# Patient Record
Sex: FEMALE | Race: WHITE | NOT HISPANIC OR LATINO | Employment: FULL TIME | URBAN - NONMETROPOLITAN AREA
[De-identification: names, ages, dates, MRNs, and addresses within clinical notes are randomized per-mention and may not be internally consistent; named-entity substitution may affect disease eponyms.]

---

## 2017-03-07 ENCOUNTER — OFFICE VISIT (OUTPATIENT)
Dept: CARDIOLOGY | Facility: CLINIC | Age: 62
End: 2017-03-07

## 2017-03-07 VITALS
SYSTOLIC BLOOD PRESSURE: 106 MMHG | HEIGHT: 65 IN | BODY MASS INDEX: 33.32 KG/M2 | WEIGHT: 200 LBS | HEART RATE: 76 BPM | DIASTOLIC BLOOD PRESSURE: 70 MMHG

## 2017-03-07 DIAGNOSIS — I25.10 CORONARY ARTERY DISEASE INVOLVING NATIVE CORONARY ARTERY OF NATIVE HEART WITHOUT ANGINA PECTORIS: Primary | ICD-10-CM

## 2017-03-07 DIAGNOSIS — E11.9 TYPE 2 DIABETES MELLITUS WITHOUT COMPLICATION, WITHOUT LONG-TERM CURRENT USE OF INSULIN (HCC): ICD-10-CM

## 2017-03-07 DIAGNOSIS — I10 ESSENTIAL HYPERTENSION: ICD-10-CM

## 2017-03-07 DIAGNOSIS — E78.49 OTHER HYPERLIPIDEMIA: ICD-10-CM

## 2017-03-07 PROBLEM — E78.5 HYPERLIPEMIA: Status: ACTIVE | Noted: 2017-03-07

## 2017-03-07 PROCEDURE — 99213 OFFICE O/P EST LOW 20 MIN: CPT | Performed by: NURSE PRACTITIONER

## 2017-03-07 RX ORDER — PRASUGREL 10 MG/1
10 TABLET, FILM COATED ORAL DAILY
Qty: 90 TABLET | Refills: 3 | Status: SHIPPED | OUTPATIENT
Start: 2017-03-07 | End: 2017-09-11 | Stop reason: SDUPTHER

## 2017-03-07 RX ORDER — MULTIVITAMIN WITH IRON
1 TABLET ORAL DAILY
COMMUNITY

## 2017-03-07 RX ORDER — GLIPIZIDE 10 MG/1
10 TABLET ORAL DAILY
COMMUNITY
End: 2017-09-11 | Stop reason: ALTCHOICE

## 2017-03-07 RX ORDER — CARVEDILOL 3.12 MG/1
3.12 TABLET ORAL DAILY
Qty: 90 TABLET | Refills: 3 | Status: SHIPPED | OUTPATIENT
Start: 2017-03-07 | End: 2017-09-11 | Stop reason: SDUPTHER

## 2017-03-07 NOTE — PROGRESS NOTES
Chief Complaint   Patient presents with   • Follow-up     Had labs in Jan., she reports A1C 9.3. She states had went to imaging center for PARTH, didn't have anyone to do PARTH that day and was told they would call her for another appointment and nobody every called her.   • Palpitations     She reports same as before, nothing new.   • Med Refill     Needs refills on Coreg and Effient-90 day.       Cardiac Complaints  dyspnea      Subjective   Paige Gibbs is a 61 y.o. female history of ischemic heart disease diagnosed in 2011 when she underwent stenting of the LAD and then in 2012 underwent stenting of the ramus. Her last cardiac workup done in 2014 showed no evidence of ischemia.  At last visit, some claudication pain was reported and PARTH was recommended.  Patient reports she could not have it done as no one was there to conduct the test and they never called her back to reschedule.  She denies any claudication pain today. Most recent labs in January show AIC still elevated at 9.3 and medication changes were made.  She states she will follow with endocrinology this month.  She does report some issues with palpitations about same as prior.  Shortness of breath and chest pain denied.  Refills of coreg and effient requested.      Cardiac History  Past Surgical History   Procedure Laterality Date   • Cardiovascular stress test  07/18/2011     3 min, 30 sec. 93% THR. bp-164/72. R/O anteroseptal ischemia   • Echo - converted  08/02/2011     >60%   • Cath lab procedure  08/12/2011     75% LAD- 2.75x18 Promus.   • Echo - converted  05/14/2012     EF 65-70%   • Cardiovascular stress test  05/14/2012     4 min, 91% THR, 100/54, septal ischemi   • Cath lab procedure  05/25/2012     90% Ramus- 2.25x18 Promus   • Converted (historical) holter  12/05/2012     AVG HR 76 BPM. Rare PVC's   • Cardiovascular stress test  12/14/2012     4 min, 90% THR>BP-150/70. Negative   • Echo - converted  08/26/2014     EF 65%, RVSP 26 mmHg   •  Cardiovascular stress test  08/26/2014     6 min, 94% THR, 132/64, negative for ischemia       Current Outpatient Prescriptions   Medication Sig Dispense Refill   • aspirin 81 MG EC tablet Take 81 mg by mouth daily.     • Canagliflozin (INVOKANA) 100 MG tablet Take  by mouth Daily.     • carvedilol (COREG) 3.125 MG tablet Take 1 tablet by mouth daily. 90 tablet 3   • cycloSPORINE (RESTASIS) 0.05 % ophthalmic emulsion 1 drop 2 (two) times a day.     • glipiZIDE (GLUCOTROL) 10 MG tablet Take 10 mg by mouth Daily.     • levothyroxine (SYNTHROID, LEVOTHROID) 125 MCG tablet Take 125 mcg by mouth daily.     • Magnesium 250 MG tablet Take  by mouth Daily.     • metFORMIN (GLUCOPHAGE) 1000 MG tablet Take 1,000 mg by mouth 2 (two) times a day with meals.     • omeprazole (PriLOSEC) 20 MG capsule Take 20 mg by mouth daily.     • prasugrel (EFFIENT) 10 MG tablet Take 1 tablet by mouth daily. 90 tablet 3   • pravastatin (PRAVACHOL) 40 MG tablet Take 40 mg by mouth daily.     • traZODone (DESYREL) 100 MG tablet Take 100 mg by mouth every night.     • triamterene-hydrochlorothiazide (DYAZIDE) 37.5-25 MG per capsule Take 1 capsule by mouth every morning.     • Vitamin D, Cholecalciferol, 1000 UNITS tablet Take  by mouth daily.       No current facility-administered medications for this visit.        Codeine and Farxiga [dapagliflozin]    Past Medical History   Diagnosis Date   • Diabetes mellitus    • GERD (gastroesophageal reflux disease)    • H/O foot surgery      left and right foot   • History of cholecystectomy    • History of tonsillectomy    • Hyperlipidemia    • Hypothyroidism    • Pancreatitis    • Vitamin D deficiency        Social History     Social History   • Marital status:      Spouse name: N/A   • Number of children: N/A   • Years of education: N/A     Occupational History   • Not on file.     Social History Main Topics   • Smoking status: Former Smoker     Quit date: 1986   • Smokeless tobacco: Never Used  "  • Alcohol use No   • Drug use: No   • Sexual activity: Not on file     Other Topics Concern   • Not on file     Social History Narrative       Family History   Problem Relation Age of Onset   • Heart attack Mother      first MI at age 28   • Heart disease Father      CABG at age 56. 1-2 stents after that       Review of Systems   Constitutional: Negative.    Respiratory: Positive for shortness of breath.    Cardiovascular: Negative.    Gastrointestinal: Negative.    Musculoskeletal: Negative.    Skin: Negative.    Neurological: Negative.    Psychiatric/Behavioral: Negative.        DiabetesYes  Thyroidnormal    Objective     Visit Vitals   • /70 (BP Location: Left arm)   • Pulse 76   • Ht 65\" (165.1 cm)   • Wt 200 lb (90.7 kg)   • BMI 33.28 kg/m2       Physical Exam   Constitutional: She is oriented to person, place, and time. She appears well-developed and well-nourished.   HENT:   Head: Normocephalic and atraumatic.   Eyes: EOM are normal. Pupils are equal, round, and reactive to light.   Neck: Normal range of motion. Neck supple.   Cardiovascular: Normal rate and regular rhythm.    Murmur heard.  Pulmonary/Chest: Effort normal and breath sounds normal.   Abdominal: Soft.   Musculoskeletal: Normal range of motion.   Neurological: She is alert and oriented to person, place, and time.   Skin: Skin is warm and dry.   Psychiatric: She has a normal mood and affect.       Procedures    Assessment/Plan     HR and BP are both stable.  No cardiac testing will be advised at present as no new concerns are voiced.  She will follow with endocrinology this month for diabetes management.  Discussed with patient about a nutritionist for diabetic diet.  She does report watching carbs and was encouraged to look at the ADA website as a resource for counting carbs.  Labs with you, could we get next copy?  6 month follow up advised or sooner if needed.      Problems Addressed this Visit        Cardiovascular and Mediastinum    " CAD (coronary artery disease) - Primary    HTN (hypertension)    Hyperlipemia       Endocrine    Type 2 diabetes mellitus without complication, without long-term current use of insulin    Relevant Medications    Canagliflozin (INVOKANA) 100 MG tablet    glipiZIDE (GLUCOTROL) 10 MG tablet              Electronically signed by JULIAN Andrade March 7, 2017 1:30 PM

## 2017-09-11 ENCOUNTER — OFFICE VISIT (OUTPATIENT)
Dept: CARDIOLOGY | Facility: CLINIC | Age: 62
End: 2017-09-11

## 2017-09-11 VITALS
DIASTOLIC BLOOD PRESSURE: 78 MMHG | SYSTOLIC BLOOD PRESSURE: 120 MMHG | WEIGHT: 212 LBS | HEIGHT: 65 IN | HEART RATE: 76 BPM | BODY MASS INDEX: 35.32 KG/M2

## 2017-09-11 DIAGNOSIS — E78.49 OTHER HYPERLIPIDEMIA: ICD-10-CM

## 2017-09-11 DIAGNOSIS — I10 ESSENTIAL HYPERTENSION: ICD-10-CM

## 2017-09-11 DIAGNOSIS — I25.10 CORONARY ARTERY DISEASE INVOLVING NATIVE CORONARY ARTERY OF NATIVE HEART WITHOUT ANGINA PECTORIS: Primary | ICD-10-CM

## 2017-09-11 DIAGNOSIS — Z95.5 HISTORY OF PLACEMENT OF STENT IN LAD CORONARY ARTERY: ICD-10-CM

## 2017-09-11 PROCEDURE — 99213 OFFICE O/P EST LOW 20 MIN: CPT | Performed by: NURSE PRACTITIONER

## 2017-09-11 RX ORDER — PRASUGREL 10 MG/1
10 TABLET, FILM COATED ORAL DAILY
Qty: 90 TABLET | Refills: 3 | Status: SHIPPED | OUTPATIENT
Start: 2017-09-11 | End: 2018-09-10 | Stop reason: SDUPTHER

## 2017-09-11 RX ORDER — CARVEDILOL 3.12 MG/1
3.12 TABLET ORAL DAILY
Qty: 90 TABLET | Refills: 3 | Status: SHIPPED | OUTPATIENT
Start: 2017-09-11 | End: 2018-09-10 | Stop reason: SDUPTHER

## 2017-09-11 NOTE — PROGRESS NOTES
Chief Complaint   Patient presents with   • Follow-up     For cardiac management.   • Palpitations     She states has palpitations all the time, this is nothing new.   • Med Refill     Needs refills on Effient and Coreg-90 day.       Subjective       Paige Gibbs is a 62 y.o. female with a history of ischemic heart disease diagnosed in 2011 when she underwent stenting of the LAD and then in 2012 underwent stenting of the ramus. Her last cardiac workup done in 2014 showed no evidence of ischemia.  In the past she had complaints of claudication pain but unable to have PARTH done. Symptoms improved and no further testing done.   Today she comes to the office for a follow up appointment and no new or worsening complaints are voiced. She reports prior to stenting in the past her main symptoms were increased fatigue and mild sharp left sided chest pains. No reoccurrence of these symptoms noted. She admits to problems with going to sleep and has tried OTC medications without benefit.       HPI         Cardiac History:    Past Surgical History:   Procedure Laterality Date   • CARDIOVASCULAR STRESS TEST  07/18/2011    3 min, 30 sec. 93% THR. bp-164/72. R/O anteroseptal ischemia   • CARDIOVASCULAR STRESS TEST  05/14/2012    4 min, 91% THR, 100/54, septal ischemi   • CARDIOVASCULAR STRESS TEST  12/14/2012    4 min, 90% THR>BP-150/70. Negative   • CARDIOVASCULAR STRESS TEST  08/26/2014    6 min, 94% THR, 132/64, negative for ischemia   • CATH LAB PROCEDURE  08/12/2011    75% LAD- 2.75x18 Promus.   • CATH LAB PROCEDURE  05/25/2012    90% Ramus- 2.25x18 Promus   • CONVERTED (HISTORICAL) HOLTER  12/05/2012    AVG HR 76 BPM. Rare PVC's   • ECHO - CONVERTED  08/02/2011    >60%   • ECHO - CONVERTED  05/14/2012    EF 65-70%   • ECHO - CONVERTED  08/26/2014    EF 65%, RVSP 26 mmHg       Current Outpatient Prescriptions   Medication Sig Dispense Refill   • aspirin 81 MG EC tablet Take 81 mg by mouth daily.     • carvedilol (COREG)  3.125 MG tablet Take 1 tablet by mouth Daily. 90 tablet 3   • cycloSPORINE (RESTASIS) 0.05 % ophthalmic emulsion 1 drop 2 (two) times a day.     • Insulin Detemir (LEVEMIR FLEXPEN SC) Inject  under the skin Daily.     • levothyroxine (SYNTHROID, LEVOTHROID) 125 MCG tablet Take 125 mcg by mouth daily.     • Magnesium 250 MG tablet Take  by mouth Daily.     • metFORMIN (GLUCOPHAGE) 1000 MG tablet Take 1,000 mg by mouth 2 (two) times a day with meals.     • omeprazole (PriLOSEC) 20 MG capsule Take 20 mg by mouth daily.     • prasugrel (EFFIENT) 10 MG tablet Take 1 tablet by mouth Daily. 90 tablet 3   • pravastatin (PRAVACHOL) 40 MG tablet Take 40 mg by mouth daily.     • traZODone (DESYREL) 100 MG tablet Take 100 mg by mouth every night.     • triamterene-hydrochlorothiazide (DYAZIDE) 37.5-25 MG per capsule Take 1 capsule by mouth every morning.     • Vitamin D, Cholecalciferol, 1000 UNITS tablet Take  by mouth daily.       No current facility-administered medications for this visit.        Codeine; Farxiga [dapagliflozin]; Invokana [canagliflozin]; and Tresiba flextouch [insulin degludec]    Past Medical History:   Diagnosis Date   • Diabetes mellitus    • GERD (gastroesophageal reflux disease)    • H/O foot surgery     left and right foot   • History of cholecystectomy    • History of tonsillectomy    • Hyperlipidemia    • Hypothyroidism    • Pancreatitis    • Vitamin D deficiency        Social History     Social History   • Marital status:      Spouse name: N/A   • Number of children: N/A   • Years of education: N/A     Occupational History   • Not on file.     Social History Main Topics   • Smoking status: Former Smoker     Quit date: 1986   • Smokeless tobacco: Never Used   • Alcohol use No   • Drug use: No   • Sexual activity: Not on file     Other Topics Concern   • Not on file     Social History Narrative       Family History   Problem Relation Age of Onset   • Heart attack Mother      first MI at age 28  "  • Heart disease Father      CABG at age 56. 1-2 stents after that       Review of Systems   Constitution: Negative for decreased appetite and malaise/fatigue.   HENT: Positive for headaches (had MRI of head last month, pt reports WNL). Negative for congestion and sore throat.    Eyes: Negative for blurred vision.   Cardiovascular: Positive for palpitations (same). Negative for chest pain, dyspnea on exertion and paroxysmal nocturnal dyspnea.   Respiratory: Negative for shortness of breath and snoring.    Endocrine: Negative for cold intolerance and heat intolerance.   Hematologic/Lymphatic: Negative for adenopathy. Does not bruise/bleed easily.   Skin: Negative for itching, nail changes and skin cancer.   Musculoskeletal: Positive for arthritis. Negative for falls and myalgias.   Gastrointestinal: Negative for abdominal pain, dysphagia, heartburn and melena.   Genitourinary: Negative for dysuria, frequency and hematuria.   Neurological: Negative for dizziness, light-headedness, loss of balance, numbness, seizures and vertigo.   Psychiatric/Behavioral: Negative for altered mental status. The patient has insomnia (not been able to go to sleep lately).    Allergic/Immunologic: Negative for environmental allergies and hives.    Diabetes- Yes  Thyroid-abnormal    Objective     /78 (BP Location: Left arm)  Pulse 76  Ht 65\" (165.1 cm)  Wt 212 lb (96.2 kg)  BMI 35.28 kg/m2    Physical Exam   Constitutional: She is oriented to person, place, and time. She appears well-nourished.   HENT:   Head: Normocephalic.   Eyes: Conjunctivae are normal. Pupils are equal, round, and reactive to light.   Neck: Normal range of motion. No JVD present. Carotid bruit is not present. No edema present. No thyromegaly present.   Cardiovascular: Normal rate, regular rhythm, S1 normal and S2 normal.    No murmur heard.  Pulses:       Radial pulses are 2+ on the right side, and 2+ on the left side.   Pulmonary/Chest: Breath sounds " normal. She has no wheezes. She has no rales.   Abdominal: Soft. Bowel sounds are normal. She exhibits no distension. There is no tenderness.   Musculoskeletal: Normal range of motion. She exhibits no edema.   Neurological: She is alert and oriented to person, place, and time.   Skin: Skin is warm and dry. No rash noted. No pallor.   Psychiatric: She has a normal mood and affect. Her behavior is normal.    Procedures          Assessment/Plan      Paige was seen today for follow-up, palpitations and med refill.    Diagnoses and all orders for this visit:    Coronary artery disease involving native coronary artery of native heart without angina pectoris  -     prasugrel (EFFIENT) 10 MG tablet; Take 1 tablet by mouth Daily.    Essential hypertension  -     carvedilol (COREG) 3.125 MG tablet; Take 1 tablet by mouth Daily.    Other hyperlipidemia    History of placement of stent in LAD coronary artery      She follows with endocrinologist for diabetic management. Her weight has increased about 12 pounds. I encouraged her on decreased caloric intake. She is monitoring glucose more closely now.   At next visit will consider repeat cardiac workup due to CAD history as well as being diabetic and risk for silent ischemia. At this time she denies any new symptoms or concerns that warrant evaluation from a cardiac standpoint. Her blood pressure and heart rate are in normal range today. No changes to medication recommended. She is tolerating Pravachol for lipid management. She continues Effient without signs of bleeding or bruising.   She is having some issues with going to sleep. I advised her to further discuss with you if consist. I also encouraged her on increased exercise which may also benefit rest/sleep.              Electronically signed by JULIAN Phillips,  September 13, 2017 5:42 PM

## 2018-01-02 ENCOUNTER — TELEPHONE (OUTPATIENT)
Dept: CARDIOLOGY | Facility: CLINIC | Age: 63
End: 2018-01-02

## 2018-01-02 NOTE — TELEPHONE ENCOUNTER
Received call from patient reporting palpitation's dizziness and chest pain and fatique for past 2 to 3 week's. States b/p was okay when was evaluated by endocrinologist in December. What are your recommendation's?    meds  effient 10mg daily  Carvedilol 3.125mg daily  Aspirin 81mg daily

## 2018-01-02 NOTE — TELEPHONE ENCOUNTER
She has significant heart history and risk factors. Recommend repeat Lexiscan stress and echo. Does she have symptoms of sleep apnea?

## 2018-01-04 DIAGNOSIS — R07.89 OTHER CHEST PAIN: ICD-10-CM

## 2018-01-04 DIAGNOSIS — R42 DIZZINESS: ICD-10-CM

## 2018-01-04 DIAGNOSIS — R53.83 OTHER FATIGUE: ICD-10-CM

## 2018-01-04 DIAGNOSIS — I25.118 CORONARY ARTERY DISEASE INVOLVING NATIVE CORONARY ARTERY OF NATIVE HEART WITH OTHER FORM OF ANGINA PECTORIS (HCC): Primary | ICD-10-CM

## 2018-01-04 NOTE — TELEPHONE ENCOUNTER
Notified patient of recommendation's and patient is okay with stress and echo. Patient states has been tested for sleep apnea about 1 to 2 years ago per Dr. Fox and was negative. States does wake up with headaches but has done this all her life.    Sparkle can you please place orders for stress and Echo? Thank you.

## 2018-01-10 ENCOUNTER — OUTSIDE FACILITY SERVICE (OUTPATIENT)
Dept: CARDIOLOGY | Facility: CLINIC | Age: 63
End: 2018-01-10

## 2018-01-10 ENCOUNTER — HOSPITAL ENCOUNTER (OUTPATIENT)
Dept: CARDIOLOGY | Facility: HOSPITAL | Age: 63
Discharge: HOME | End: 2018-01-10

## 2018-01-10 DIAGNOSIS — R53.83 OTHER FATIGUE: ICD-10-CM

## 2018-01-10 DIAGNOSIS — I25.118 CORONARY ARTERY DISEASE INVOLVING NATIVE CORONARY ARTERY OF NATIVE HEART WITH OTHER FORM OF ANGINA PECTORIS (HCC): ICD-10-CM

## 2018-01-10 DIAGNOSIS — R42 DIZZINESS: ICD-10-CM

## 2018-01-10 DIAGNOSIS — R07.89 OTHER CHEST PAIN: ICD-10-CM

## 2018-01-10 LAB
MAXIMAL PREDICTED HEART RATE: 158 BPM
MAXIMAL PREDICTED HEART RATE: 158 BPM
STRESS TARGET HR: 134 BPM
STRESS TARGET HR: 134 BPM

## 2018-01-10 PROCEDURE — 93306 TTE W/DOPPLER COMPLETE: CPT

## 2018-01-10 PROCEDURE — 93017 CV STRESS TEST TRACING ONLY: CPT

## 2018-01-10 PROCEDURE — 93018 CV STRESS TEST I&R ONLY: CPT | Performed by: INTERNAL MEDICINE

## 2018-01-10 PROCEDURE — 78452 HT MUSCLE IMAGE SPECT MULT: CPT

## 2018-01-10 PROCEDURE — 93306 TTE W/DOPPLER COMPLETE: CPT | Performed by: INTERNAL MEDICINE

## 2018-01-10 PROCEDURE — 78452 HT MUSCLE IMAGE SPECT MULT: CPT | Performed by: INTERNAL MEDICINE

## 2018-01-10 PROCEDURE — A9500 TC99M SESTAMIBI: HCPCS | Performed by: INTERNAL MEDICINE

## 2018-01-10 PROCEDURE — 25010000002 REGADENOSON 0.4 MG/5ML SOLUTION: Performed by: INTERNAL MEDICINE

## 2018-01-10 PROCEDURE — 0 TECHNETIUM SESTAMIBI: Performed by: INTERNAL MEDICINE

## 2018-01-10 RX ADMIN — TECHNETIUM TC 99M SESTAMIBI 1 DOSE: 1 INJECTION INTRAVENOUS at 09:45

## 2018-01-10 RX ADMIN — REGADENOSON 0.4 MG: 0.08 INJECTION, SOLUTION INTRAVENOUS at 09:45

## 2018-01-12 ENCOUNTER — TELEPHONE (OUTPATIENT)
Dept: CARDIOLOGY | Facility: CLINIC | Age: 63
End: 2018-01-12

## 2018-01-12 NOTE — TELEPHONE ENCOUNTER
Patient aware of stress test results and recommendations.  No definite ischemia and to continue home medications.

## 2018-03-12 ENCOUNTER — OFFICE VISIT (OUTPATIENT)
Dept: CARDIOLOGY | Facility: CLINIC | Age: 63
End: 2018-03-12

## 2018-03-12 VITALS
BODY MASS INDEX: 37.56 KG/M2 | DIASTOLIC BLOOD PRESSURE: 72 MMHG | HEIGHT: 64 IN | WEIGHT: 220 LBS | HEART RATE: 76 BPM | SYSTOLIC BLOOD PRESSURE: 126 MMHG

## 2018-03-12 DIAGNOSIS — R00.2 PALPITATIONS: ICD-10-CM

## 2018-03-12 DIAGNOSIS — I10 ESSENTIAL HYPERTENSION: ICD-10-CM

## 2018-03-12 DIAGNOSIS — Z95.5 HISTORY OF PLACEMENT OF STENT IN LAD CORONARY ARTERY: ICD-10-CM

## 2018-03-12 DIAGNOSIS — E78.2 MIXED HYPERLIPIDEMIA: ICD-10-CM

## 2018-03-12 DIAGNOSIS — I25.10 CORONARY ARTERY DISEASE INVOLVING NATIVE CORONARY ARTERY OF NATIVE HEART WITHOUT ANGINA PECTORIS: Primary | ICD-10-CM

## 2018-03-12 PROCEDURE — 99213 OFFICE O/P EST LOW 20 MIN: CPT | Performed by: NURSE PRACTITIONER

## 2018-03-12 NOTE — PROGRESS NOTES
Chief Complaint   Patient presents with   • Follow-up     cardiac management,    • Med Refill     request 90 day refill's on cardiac medication's, patient brought medication list with visit.    • Palpitations     reports has a lot of palpitation's. does not drink caffiene.       Subjective       Paige Gibbs is a 62 y.o. female with a history of ischemic heart disease diagnosed in 2011 when she underwent stenting of the LAD and then in 2012 underwent stenting of the ramus. Her last cardiac workup done in 2014 showed no evidence of ischemia.  In the past she had complaints of claudication pain but unable to have PARTH done. Symptoms improved and no further testing done. In January 2018, she called the office to report palpitations, dizziness, chest pain and fatigue. Repeat cardiac workup was done and no definite ischemia was noted. LV ejection fraction was normal and no significant valvular issues noted. Today she comes to the office for a follow up visit and denies chest pain or issues with shortness of breath. She reports an overnight oxygen monitor was done and in normal limits. Palpitations in form of few skips are now rarely occurring, maybe once a week. No chest pain, dizziness or significant fatigue noted recently. She reports her most recent HA1C has improved to 7.     HPI: as noted         Cardiac History:    Past Surgical History:   Procedure Laterality Date   • CARDIAC CATHETERIZATION  08/12/2011    75% LAD- 2.75x18 Promus.   • CARDIAC CATHETERIZATION  05/25/2012    90% Ramus- 2.25x18 Promus   • CARDIOVASCULAR STRESS TEST  07/18/2011    3 min, 30 sec. 93% THR. bp-164/72. R/O anteroseptal ischemia   • CARDIOVASCULAR STRESS TEST  05/14/2012    4 min, 91% THR, 100/54, septal ischemi   • CARDIOVASCULAR STRESS TEST  12/14/2012    4 min, 90% THR>BP-150/70. Negative   • CARDIOVASCULAR STRESS TEST  08/26/2014    6 min, 94% THR, 132/64, negative for ischemia   • CARDIOVASCULAR STRESS TEST  01/10/2018     L.Cardiolite- EF > 65%. R/O Breast attenuation   • CARDIOVASCULAR STRESS TEST  01/10/2018    Lexiscan- no definite ischemia noted   • CONVERTED (HISTORICAL) HOLTER  12/05/2012    AVG HR 76 BPM. Rare PVC's   • ECHO - CONVERTED  08/02/2011    >60%   • ECHO - CONVERTED  05/14/2012    EF 65-70%   • ECHO - CONVERTED  08/26/2014    EF 65%, RVSP 26 mmHg   • ECHO - CONVERTED  01/10/2018    EF 65%. RVSP- 30 mmHg   • ECHO - CONVERTED  01/10/2018    EF 60-65%, no AS, mild MR, RVSP 30 mmHg   • ECHO - CONVERTED  01/10/2018    EF 60-65%, no AS, mild MR, RVSP 30 mmHg       Current Outpatient Prescriptions   Medication Sig Dispense Refill   • aspirin 81 MG EC tablet Take 81 mg by mouth daily.     • carvedilol (COREG) 3.125 MG tablet Take 1 tablet by mouth Daily. 90 tablet 3   • cycloSPORINE (RESTASIS) 0.05 % ophthalmic emulsion 1 drop 2 (two) times a day.     • Insulin Detemir (LEVEMIR FLEXPEN SC) Inject  under the skin Daily.     • Insulin Lispro (HUMALOG KWIKPEN SC) Inject  under the skin. As directed.     • levothyroxine (SYNTHROID, LEVOTHROID) 125 MCG tablet Take 125 mcg by mouth daily.     • Magnesium 250 MG tablet Take  by mouth Daily.     • metFORMIN (GLUCOPHAGE) 1000 MG tablet Take 1,000 mg by mouth 2 (two) times a day with meals.     • omeprazole (PriLOSEC) 20 MG capsule Take 20 mg by mouth daily.     • prasugrel (EFFIENT) 10 MG tablet Take 1 tablet by mouth Daily. 90 tablet 3   • pravastatin (PRAVACHOL) 40 MG tablet Take 40 mg by mouth daily.     • traZODone (DESYREL) 100 MG tablet Take 100 mg by mouth every night.     • triamterene-hydrochlorothiazide (DYAZIDE) 37.5-25 MG per capsule Take 1 capsule by mouth every morning.     • Vitamin D, Cholecalciferol, 1000 UNITS tablet Take  by mouth daily.       No current facility-administered medications for this visit.        Codeine; Farxiga [dapagliflozin]; Invokana [canagliflozin]; and Tresiba flextouch [insulin degludec]    Past Medical History:   Diagnosis Date   • Diabetes  mellitus    • GERD (gastroesophageal reflux disease)    • H/O foot surgery     left and right foot   • History of cholecystectomy    • History of tonsillectomy    • Hyperlipidemia    • Hypothyroidism    • Pancreatitis    • Vitamin D deficiency        Social History     Social History   • Marital status:      Spouse name: N/A   • Number of children: N/A   • Years of education: N/A     Occupational History   • Not on file.     Social History Main Topics   • Smoking status: Former Smoker     Quit date: 1986   • Smokeless tobacco: Never Used   • Alcohol use No   • Drug use: No   • Sexual activity: Defer     Other Topics Concern   • Not on file     Social History Narrative   • No narrative on file       Family History   Problem Relation Age of Onset   • Heart attack Mother      first MI at age 28   • Heart disease Father      CABG at age 56. 1-2 stents after that       Review of Systems   Constitution: Positive for malaise/fatigue (not worse) and weight gain. Negative for decreased appetite, diaphoresis and weakness.   HENT: Negative for congestion and hoarse voice.    Cardiovascular: Positive for palpitations. Negative for chest pain and leg swelling.   Respiratory: Negative for cough, shortness of breath and sleep disturbances due to breathing.    Endocrine: Negative for cold intolerance and heat intolerance.   Hematologic/Lymphatic: Negative for bleeding problem. Does not bruise/bleed easily.   Skin: Negative for color change and nail changes.   Musculoskeletal: Negative for falls and myalgias.   Gastrointestinal: Negative for abdominal pain, change in bowel habit, melena and nausea.   Genitourinary: Negative for dysuria and hematuria.   Neurological: Positive for dizziness (associated with increased palpitations) and headaches (pt reports recent brain scan with normal results).   Psychiatric/Behavioral: The patient has insomnia.         Diabetes- Yes  Thyroid-normal    Objective     /72 (BP Location:  "Right arm)   Pulse 76   Ht 162.6 cm (64\")   Wt 99.8 kg (220 lb)   BMI 37.76 kg/m²     Physical Exam   Constitutional: She is oriented to person, place, and time.   HENT:   Head: Normocephalic.   Eyes: Conjunctivae are normal. Pupils are equal, round, and reactive to light.   Neck: Neck supple. No JVD present.   Cardiovascular: Normal rate and regular rhythm.    Pulmonary/Chest: Effort normal.   Abdominal: Soft. Bowel sounds are normal. She exhibits no distension. There is no tenderness.   Musculoskeletal: She exhibits no edema.   Neurological: She is alert and oriented to person, place, and time.   Skin: Skin is warm and dry. No pallor.   Psychiatric: She has a normal mood and affect. Her behavior is normal. Judgment and thought content normal.   Vitals reviewed.     Procedures: none today      Assessment/Plan      Paige was seen today for follow-up, med refill and palpitations.    Diagnoses and all orders for this visit:    Coronary artery disease involving native coronary artery of native heart without angina pectoris    Essential hypertension    Mixed hyperlipidemia    History of placement of stent in LAD coronary artery    Palpitations      I have requested a copy of recent lab report. Currently she is on Pravachol for lipid management. The report of her recent stress and echo were reviewed which did not show obvious ischemia. Her blood pressure today is normal. Heart rate and rhythm are normal. Advised to continue same cardiac medications: Dyazide, Coreg, and mag. Should palpitations worsen she understands to call and cardiac event monitor will be considered.   Discussed the patient's BMI with her. BMI is above normal parameters. Follow-up plan includes:  nutrition counseling. She admits to very limited caffeine. Patient reports exercise is limited due to plantar fascitis. I encouraged her on core and upper body activity, such as chair exercises.   A 6 month follow up scheduled. Please call sooner for " problems.            Electronically signed by JULIAN Phillips,  March 13, 2018 7:50 AM

## 2018-03-12 NOTE — PATIENT INSTRUCTIONS
Diabetes Mellitus and Food  It is important for you to manage your blood sugar (glucose) level. Your blood glucose level can be greatly affected by what you eat. Eating healthier foods in the appropriate amounts throughout the day at about the same time each day will help you control your blood glucose level. It can also help slow or prevent worsening of your diabetes mellitus. Healthy eating may even help you improve the level of your blood pressure and reach or maintain a healthy weight.  General recommendations for healthful eating and cooking habits include:  · Eating meals and snacks regularly. Avoid going long periods of time without eating to lose weight.  · Eating a diet that consists mainly of plant-based foods, such as fruits, vegetables, nuts, legumes, and whole grains.  · Using low-heat cooking methods, such as baking, instead of high-heat cooking methods, such as deep frying.  Work with your dietitian to make sure you understand how to use the Nutrition Facts information on food labels.  How can food affect me?  Carbohydrates   Carbohydrates affect your blood glucose level more than any other type of food. Your dietitian will help you determine how many carbohydrates to eat at each meal and teach you how to count carbohydrates. Counting carbohydrates is important to keep your blood glucose at a healthy level, especially if you are using insulin or taking certain medicines for diabetes mellitus.  Alcohol   Alcohol can cause sudden decreases in blood glucose (hypoglycemia), especially if you use insulin or take certain medicines for diabetes mellitus. Hypoglycemia can be a life-threatening condition. Symptoms of hypoglycemia (sleepiness, dizziness, and disorientation) are similar to symptoms of having too much alcohol.  If your health care provider has given you approval to drink alcohol, do so in moderation and use the following guidelines:  · Women should not have more than one drink per day, and men  should not have more than two drinks per day. One drink is equal to:  ¨ 12 oz of beer.  ¨ 5 oz of wine.  ¨ 1½ oz of hard liquor.  · Do not drink on an empty stomach.  · Keep yourself hydrated. Have water, diet soda, or unsweetened iced tea.  · Regular soda, juice, and other mixers might contain a lot of carbohydrates and should be counted.  What foods are not recommended?  As you make food choices, it is important to remember that all foods are not the same. Some foods have fewer nutrients per serving than other foods, even though they might have the same number of calories or carbohydrates. It is difficult to get your body what it needs when you eat foods with fewer nutrients. Examples of foods that you should avoid that are high in calories and carbohydrates but low in nutrients include:  · Trans fats (most processed foods list trans fats on the Nutrition Facts label).  · Regular soda.  · Juice.  · Candy.  · Sweets, such as cake, pie, doughnuts, and cookies.  · Fried foods.  What foods can I eat?  Eat nutrient-rich foods, which will nourish your body and keep you healthy. The food you should eat also will depend on several factors, including:  · The calories you need.  · The medicines you take.  · Your weight.  · Your blood glucose level.  · Your blood pressure level.  · Your cholesterol level.  You should eat a variety of foods, including:  · Protein.  ¨ Lean cuts of meat.  ¨ Proteins low in saturated fats, such as fish, egg whites, and beans. Avoid processed meats.  · Fruits and vegetables.  ¨ Fruits and vegetables that may help control blood glucose levels, such as apples, mangoes, and yams.  · Dairy products.  ¨ Choose fat-free or low-fat dairy products, such as milk, yogurt, and cheese.  · Grains, bread, pasta, and rice.  ¨ Choose whole grain products, such as multigrain bread, whole oats, and brown rice. These foods may help control blood pressure.  · Fats.  ¨ Foods containing healthful fats, such as nuts,  avocado, olive oil, canola oil, and fish.  Does everyone with diabetes mellitus have the same meal plan?  Because every person with diabetes mellitus is different, there is not one meal plan that works for everyone. It is very important that you meet with a dietitian who will help you create a meal plan that is just right for you.  This information is not intended to replace advice given to you by your health care provider. Make sure you discuss any questions you have with your health care provider.  Document Released: 09/14/2006 Document Revised: 05/25/2017 Document Reviewed: 11/14/2014  [employer] Interactive Patient Education © 2017 [employer]

## 2018-09-10 ENCOUNTER — OFFICE VISIT (OUTPATIENT)
Dept: CARDIOLOGY | Facility: CLINIC | Age: 63
End: 2018-09-10

## 2018-09-10 VITALS
DIASTOLIC BLOOD PRESSURE: 70 MMHG | HEIGHT: 64 IN | HEART RATE: 72 BPM | WEIGHT: 216 LBS | BODY MASS INDEX: 36.88 KG/M2 | SYSTOLIC BLOOD PRESSURE: 128 MMHG

## 2018-09-10 DIAGNOSIS — E78.2 MIXED HYPERLIPIDEMIA: ICD-10-CM

## 2018-09-10 DIAGNOSIS — I25.10 CORONARY ARTERY DISEASE INVOLVING NATIVE CORONARY ARTERY OF NATIVE HEART WITHOUT ANGINA PECTORIS: Primary | ICD-10-CM

## 2018-09-10 DIAGNOSIS — I10 ESSENTIAL HYPERTENSION: ICD-10-CM

## 2018-09-10 DIAGNOSIS — Z95.5 HISTORY OF PLACEMENT OF STENT IN LAD CORONARY ARTERY: ICD-10-CM

## 2018-09-10 PROCEDURE — 99213 OFFICE O/P EST LOW 20 MIN: CPT | Performed by: NURSE PRACTITIONER

## 2018-09-10 RX ORDER — CARVEDILOL 3.12 MG/1
3.12 TABLET ORAL DAILY
Qty: 90 TABLET | Refills: 3 | Status: SHIPPED | OUTPATIENT
Start: 2018-09-10 | End: 2019-03-11 | Stop reason: SDUPTHER

## 2018-09-10 RX ORDER — TRIAMTERENE AND HYDROCHLOROTHIAZIDE 37.5; 25 MG/1; MG/1
1 CAPSULE ORAL EVERY MORNING
Qty: 90 CAPSULE | Refills: 3 | Status: SHIPPED | OUTPATIENT
Start: 2018-09-10 | End: 2019-03-11 | Stop reason: SDUPTHER

## 2018-09-10 RX ORDER — PRASUGREL 10 MG/1
10 TABLET, FILM COATED ORAL DAILY
Qty: 90 TABLET | Refills: 3 | Status: SHIPPED | OUTPATIENT
Start: 2018-09-10 | End: 2019-03-11 | Stop reason: SDUPTHER

## 2018-09-10 RX ORDER — PRAVASTATIN SODIUM 40 MG
40 TABLET ORAL DAILY
Qty: 90 TABLET | Refills: 3 | Status: SHIPPED | OUTPATIENT
Start: 2018-09-10 | End: 2019-03-11 | Stop reason: SDUPTHER

## 2018-09-10 NOTE — PROGRESS NOTES
Chief Complaint   Patient presents with   • Follow-up     For cardiac management. Labs per PCP in July.    • Med Refill     Needs refills on cardiac meds for 90 days to [facility].        Subjective       Paige Gibbs is a 63 y.o. female with a history of ischemic heart disease diagnosed in 2011 when she underwent stenting of the LAD and then in 2012 underwent stenting of the ramus. Her last cardiac workup done in 2014 showed no evidence of ischemia.  In the past she had complaints of claudication pain but unable to have PARTH done. Symptoms improved and no further testing done. In January 2018, she called the office to report palpitations, dizziness, chest pain and fatigue. Repeat cardiac workup was done and no definite ischemia was noted. LV ejection fraction was normal and no significant valvular issues noted.  Today she comes to the office for a follow up visit and no cardiac concerns voiced. She reports labs done in July and reports were good including cholesterol and thyroid. No recent medication changes reported. She is maintaining her normal activities without issue.     HPI     Cardiac History:    Past Surgical History:   Procedure Laterality Date   • CARDIAC CATHETERIZATION  08/12/2011    75% LAD- 2.75x18 Promus.   • CARDIAC CATHETERIZATION  05/25/2012    90% Ramus- 2.25x18 Promus   • CARDIOVASCULAR STRESS TEST  07/18/2011    3 min, 30 sec. 93% THR. bp-164/72. R/O anteroseptal ischemia   • CARDIOVASCULAR STRESS TEST  05/14/2012    4 min, 91% THR, 100/54, septal ischemi   • CARDIOVASCULAR STRESS TEST  12/14/2012    4 min, 90% THR>BP-150/70. Negative   • CARDIOVASCULAR STRESS TEST  08/26/2014    6 min, 94% THR, 132/64, negative for ischemia   • CARDIOVASCULAR STRESS TEST  01/10/2018    L.Cardiolite- EF > 65%. R/O Breast attenuation   • CARDIOVASCULAR STRESS TEST  01/10/2018    Lexiscan- no definite ischemia noted   • CONVERTED (HISTORICAL) HOLTER  12/05/2012    AVG HR 76 BPM. Rare PVC's   • ECHO -  CONVERTED  08/02/2011    >60%   • ECHO - CONVERTED  05/14/2012    EF 65-70%   • ECHO - CONVERTED  08/26/2014    EF 65%, RVSP 26 mmHg   • ECHO - CONVERTED  01/10/2018    EF 65%. RVSP- 30 mmHg   • ECHO - CONVERTED  01/10/2018    EF 60-65%, no AS, mild MR, RVSP 30 mmHg   • ECHO - CONVERTED  01/10/2018    EF 60-65%, no AS, mild MR, RVSP 30 mmHg       Current Outpatient Prescriptions   Medication Sig Dispense Refill   • aspirin 81 MG EC tablet Take 81 mg by mouth daily.     • carvedilol (COREG) 3.125 MG tablet Take 1 tablet by mouth Daily. 90 tablet 3   • Insulin Detemir (LEVEMIR FLEXPEN SC) Inject  under the skin Daily.     • Insulin Lispro (HUMALOG KWIKPEN SC) Inject  under the skin. As directed.     • levothyroxine (SYNTHROID, LEVOTHROID) 125 MCG tablet Take 125 mcg by mouth daily.     • Magnesium 250 MG tablet Take  by mouth Daily.     • metFORMIN (GLUCOPHAGE) 1000 MG tablet Take 1,000 mg by mouth 2 (two) times a day with meals.     • omeprazole (PriLOSEC) 20 MG capsule Take 20 mg by mouth daily.     • prasugrel (EFFIENT) 10 MG tablet Take 1 tablet by mouth Daily. 90 tablet 3   • pravastatin (PRAVACHOL) 40 MG tablet Take 1 tablet by mouth Daily. 90 tablet 3   • traZODone (DESYREL) 100 MG tablet Take 100 mg by mouth every night.     • triamterene-hydrochlorothiazide (DYAZIDE) 37.5-25 MG per capsule Take 1 capsule by mouth Every Morning. 90 capsule 3   • Vitamin D, Cholecalciferol, 1000 UNITS tablet Take  by mouth daily.       No current facility-administered medications for this visit.        Codeine; Farxiga [dapagliflozin]; Invokana [canagliflozin]; and Tresiba flextouch [insulin degludec]    Past Medical History:   Diagnosis Date   • Diabetes mellitus (CMS/HCC)    • GERD (gastroesophageal reflux disease)    • H/O foot surgery     left and right foot   • History of cholecystectomy    • History of tonsillectomy    • Hyperlipidemia    • Hypothyroidism    • Pancreatitis    • Vitamin D deficiency        Social History  "    Social History   • Marital status:      Spouse name: N/A   • Number of children: N/A   • Years of education: N/A     Occupational History   • Not on file.     Social History Main Topics   • Smoking status: Former Smoker     Quit date: 1986   • Smokeless tobacco: Never Used   • Alcohol use No   • Drug use: No   • Sexual activity: Defer     Other Topics Concern   • Not on file     Social History Narrative   • No narrative on file       Family History   Problem Relation Age of Onset   • Heart attack Mother         first MI at age 28   • Heart disease Father         CABG at age 56. 1-2 stents after that       Review of Systems   Constitution: Negative for decreased appetite and malaise/fatigue.   HENT: Negative for congestion, hoarse voice and nosebleeds.    Eyes: Negative for blurred vision and visual disturbance.   Cardiovascular: Negative for chest pain, leg swelling, near-syncope and palpitations.   Respiratory: Negative for shortness of breath and sleep disturbances due to breathing.    Endocrine: Negative for cold intolerance and heat intolerance.   Hematologic/Lymphatic: Negative for adenopathy. Does not bruise/bleed easily.   Skin: Negative for dry skin and itching.   Musculoskeletal: Negative for joint pain, muscle cramps and myalgias.   Gastrointestinal: Negative for abdominal pain, change in bowel habit, dysphagia, heartburn, melena and nausea.   Genitourinary: Negative for dysuria and hematuria.   Neurological: Negative for dizziness and light-headedness.   Psychiatric/Behavioral: Negative for altered mental status. The patient does not have insomnia.         Objective     /70   Pulse 72   Ht 162.6 cm (64\")   Wt 98 kg (216 lb)   BMI 37.08 kg/m²     Physical Exam   Constitutional: She is oriented to person, place, and time. Vital signs are normal. She appears well-developed and well-nourished. No distress.   HENT:   Head: Normocephalic.   Eyes: Pupils are equal, round, and reactive to " light. Conjunctivae are normal.   Neck: Normal range of motion. Neck supple. Carotid bruit is not present.   Cardiovascular: Normal rate, regular rhythm, S1 normal and S2 normal.    No murmur heard.  Pulses:       Radial pulses are 2+ on the right side, and 2+ on the left side.   Pulmonary/Chest: Breath sounds normal. She has no wheezes. She has no rales.   Abdominal: Soft. Bowel sounds are normal. She exhibits no distension. There is no tenderness.   Musculoskeletal: Normal range of motion. She exhibits no edema.   Neurological: She is alert and oriented to person, place, and time.   Skin: Skin is warm and dry. No pallor.   Psychiatric: She has a normal mood and affect. Her behavior is normal.        Procedures:none today        Assessment/Plan      Paige was seen today for follow-up and med refill.    Diagnoses and all orders for this visit:    Coronary artery disease involving native coronary artery of native heart without angina pectoris  -     prasugrel (EFFIENT) 10 MG tablet; Take 1 tablet by mouth Daily.    Essential hypertension  -     carvedilol (COREG) 3.125 MG tablet; Take 1 tablet by mouth Daily.    Mixed hyperlipidemia    History of placement of stent in LAD coronary artery    Other orders  -     triamterene-hydrochlorothiazide (DYAZIDE) 37.5-25 MG per capsule; Take 1 capsule by mouth Every Morning.  -     pravastatin (PRAVACHOL) 40 MG tablet; Take 1 tablet by mouth Daily.    For management of CAD she continues Effient and low dose aspirin without increased bruising or signs of bleeding.     From a cardiac standpoint she appears stable. Vital signs are normal and heart rhythm regular. No cardiac testing advised at this time.     Patient's Body mass index is 37.08 kg/m². BMI is above normal parameters. Recommendations include: nutrition counseling. Diet for weight loss encouraged. She follows with specialist regarding thyroid and diabetes.     She reports lipids were well controlled based on most  recent labs. Continue statin therapy.     A 6 month follow up visit scheduled. Please call sooner for cardiac problems.             Electronically signed by JULIAN Phillips,  September 10, 2018 12:52 PM

## 2018-11-05 ENCOUNTER — TELEPHONE (OUTPATIENT)
Dept: CARDIOLOGY | Facility: CLINIC | Age: 63
End: 2018-11-05

## 2018-11-05 NOTE — TELEPHONE ENCOUNTER
Received fax from [facility] for cardiac clearance for patient to have a colonoscopy and/or EGD. Patient is on aspirin and Effient. According to our records, patient's last stent was done on 05/25/12.       Fax [phone]

## 2019-03-11 ENCOUNTER — OFFICE VISIT (OUTPATIENT)
Dept: CARDIOLOGY | Facility: CLINIC | Age: 64
End: 2019-03-11

## 2019-03-11 VITALS
BODY MASS INDEX: 36.54 KG/M2 | HEART RATE: 68 BPM | DIASTOLIC BLOOD PRESSURE: 72 MMHG | HEIGHT: 64 IN | SYSTOLIC BLOOD PRESSURE: 116 MMHG | WEIGHT: 214 LBS

## 2019-03-11 DIAGNOSIS — I10 ESSENTIAL HYPERTENSION: ICD-10-CM

## 2019-03-11 DIAGNOSIS — E66.9 OBESITY (BMI 30-39.9): ICD-10-CM

## 2019-03-11 DIAGNOSIS — E11.9 TYPE 2 DIABETES MELLITUS WITHOUT COMPLICATION, WITHOUT LONG-TERM CURRENT USE OF INSULIN (HCC): ICD-10-CM

## 2019-03-11 DIAGNOSIS — I25.10 CORONARY ARTERY DISEASE INVOLVING NATIVE CORONARY ARTERY OF NATIVE HEART WITHOUT ANGINA PECTORIS: ICD-10-CM

## 2019-03-11 DIAGNOSIS — R00.2 PALPITATIONS: ICD-10-CM

## 2019-03-11 DIAGNOSIS — E78.2 MIXED HYPERLIPIDEMIA: ICD-10-CM

## 2019-03-11 DIAGNOSIS — Z95.5 HISTORY OF PLACEMENT OF STENT IN LAD CORONARY ARTERY: Primary | ICD-10-CM

## 2019-03-11 PROCEDURE — 99213 OFFICE O/P EST LOW 20 MIN: CPT | Performed by: NURSE PRACTITIONER

## 2019-03-11 RX ORDER — CARVEDILOL 3.12 MG/1
3.12 TABLET ORAL DAILY
Qty: 90 TABLET | Refills: 3 | Status: SHIPPED | OUTPATIENT
Start: 2019-03-11 | End: 2020-03-06

## 2019-03-11 RX ORDER — PRAVASTATIN SODIUM 40 MG
40 TABLET ORAL DAILY
Qty: 90 TABLET | Refills: 3 | Status: SHIPPED | OUTPATIENT
Start: 2019-03-11 | End: 2020-05-14

## 2019-03-11 RX ORDER — PRAMIPEXOLE DIHYDROCHLORIDE 0.12 MG/1
0.12 TABLET ORAL AS NEEDED
COMMUNITY
End: 2020-03-23 | Stop reason: ALTCHOICE

## 2019-03-11 RX ORDER — TRIAMTERENE AND HYDROCHLOROTHIAZIDE 37.5; 25 MG/1; MG/1
1 CAPSULE ORAL EVERY MORNING
Qty: 90 CAPSULE | Refills: 3 | Status: SHIPPED | OUTPATIENT
Start: 2019-03-11 | End: 2020-05-04

## 2019-03-11 RX ORDER — PRASUGREL 10 MG/1
10 TABLET, FILM COATED ORAL DAILY
Qty: 90 TABLET | Refills: 3 | Status: SHIPPED | OUTPATIENT
Start: 2019-03-11 | End: 2020-03-23

## 2019-03-11 NOTE — PROGRESS NOTES
Chief Complaint   Patient presents with   • Follow-up     For cardiac management. Patient is on aspirin. Last lab work was done about 5 months ago per PCP, not in chart.    • Med Refill     Needs refills on cardiac medications.        Cardiac Complaints  palpitations      Subjective   Paige Gibbs is a 63 y.o. female with HTN, hyperlipidemia, DM, and  ischemic heart disease diagnosed in 2011 when she underwent stenting of the LAD and then in 2012 underwent stenting of the ramus. Her last cardiac workup done in 2014 showed no evidence of ischemia.  In the past she had complaints of claudication pain but unable to have PARTH done. Symptoms improved and no further testing done. In January 2018, she called the office to report palpitations, dizziness, chest pain and fatigue. Repeat cardiac workup in 2018 showed no definite ischemia with normal LV ejection fraction and no significant valvular issues noted.  She returns today for follow up and denies any new concerns.  She denies any chest pain, shortness of breath, and dizziness. She does admit to some palpitations but denies any worse than before.  She does admit to some on rare occasion and admits to cough with it.  She admits to labs with PCP most recently done with PCP about 5 months ago and she states AIC of 8.4% at last visit.  She denies any medication changes were made but will see Dr. Bustamante again in June for endocrine. She will have AIC repeated in April.  Refills of cardiac meds requested for 90 day supply.        Cardiac History  Past Surgical History:   Procedure Laterality Date   • CARDIAC CATHETERIZATION  08/12/2011    75% LAD- 2.75x18 Promus.   • CARDIAC CATHETERIZATION  05/25/2012    90% Ramus- 2.25x18 Promus   • CARDIOVASCULAR STRESS TEST  07/18/2011    3 min, 30 sec. 93% THR. bp-164/72. R/O anteroseptal ischemia   • CARDIOVASCULAR STRESS TEST  05/14/2012    4 min, 91% THR, 100/54, septal ischemi   • CARDIOVASCULAR STRESS TEST  12/14/2012    4 min, 90%  THR>BP-150/70. Negative   • CARDIOVASCULAR STRESS TEST  08/26/2014    6 min, 94% THR, 132/64, negative for ischemia   • CARDIOVASCULAR STRESS TEST  01/10/2018    L.Cardiolite- EF > 65%. R/O Breast attenuation   • CARDIOVASCULAR STRESS TEST  01/10/2018    Lexiscan- no definite ischemia noted   • CONVERTED (HISTORICAL) HOLTER  12/05/2012    AVG HR 76 BPM. Rare PVC's   • ECHO - CONVERTED  08/02/2011    >60%   • ECHO - CONVERTED  05/14/2012    EF 65-70%   • ECHO - CONVERTED  08/26/2014    EF 65%, RVSP 26 mmHg   • ECHO - CONVERTED  01/10/2018    EF 65%. RVSP- 30 mmHg   • ECHO - CONVERTED  01/10/2018    EF 60-65%, no AS, mild MR, RVSP 30 mmHg   • ECHO - CONVERTED  01/10/2018    EF 60-65%, no AS, mild MR, RVSP 30 mmHg       Current Outpatient Medications   Medication Sig Dispense Refill   • aspirin 81 MG EC tablet Take 81 mg by mouth daily.     • carvedilol (COREG) 3.125 MG tablet Take 1 tablet by mouth Daily. 90 tablet 3   • Insulin Detemir (LEVEMIR FLEXPEN SC) Inject  under the skin Daily.     • Insulin Lispro (HUMALOG KWIKPEN SC) Inject  under the skin. As directed.     • levothyroxine (SYNTHROID, LEVOTHROID) 125 MCG tablet Take 125 mcg by mouth daily.     • Magnesium 250 MG tablet Take  by mouth Daily.     • metFORMIN (GLUCOPHAGE) 1000 MG tablet Take 1,000 mg by mouth 2 (two) times a day with meals.     • omeprazole (PriLOSEC) 20 MG capsule Take 20 mg by mouth daily.     • pramipexole (MIRAPEX) 0.125 MG tablet Take 0.125 mg by mouth As Needed.     • prasugrel (EFFIENT) 10 MG tablet Take 1 tablet by mouth Daily. 90 tablet 3   • pravastatin (PRAVACHOL) 40 MG tablet Take 1 tablet by mouth Daily. 90 tablet 3   • traZODone (DESYREL) 100 MG tablet Take 100 mg by mouth every night.     • triamterene-hydrochlorothiazide (DYAZIDE) 37.5-25 MG per capsule Take 1 capsule by mouth Every Morning. 90 capsule 3   • Vitamin D, Cholecalciferol, 1000 UNITS tablet Take  by mouth daily.       No current facility-administered medications  for this visit.        Codeine; Farxiga [dapagliflozin]; Invokana [canagliflozin]; and Tresiba flextouch [insulin degludec]    Past Medical History:   Diagnosis Date   • Diabetes mellitus (CMS/HCC)    • GERD (gastroesophageal reflux disease)    • H/O foot surgery     left and right foot   • History of cholecystectomy    • History of tonsillectomy    • Hyperlipidemia    • Hypothyroidism    • Pancreatitis    • Vitamin D deficiency        Social History     Socioeconomic History   • Marital status:      Spouse name: Not on file   • Number of children: Not on file   • Years of education: Not on file   • Highest education level: Not on file   Social Needs   • Financial resource strain: Not on file   • Food insecurity - worry: Not on file   • Food insecurity - inability: Not on file   • Transportation needs - medical: Not on file   • Transportation needs - non-medical: Not on file   Occupational History   • Not on file   Tobacco Use   • Smoking status: Former Smoker     Last attempt to quit:      Years since quittin.2   • Smokeless tobacco: Never Used   Substance and Sexual Activity   • Alcohol use: No   • Drug use: No   • Sexual activity: Defer   Other Topics Concern   • Not on file   Social History Narrative   • Not on file       Family History   Problem Relation Age of Onset   • Heart attack Mother         first MI at age 28   • Heart disease Father         CABG at age 56. 1-2 stents after that       Review of Systems   Constitution: Negative for weakness and malaise/fatigue.   Cardiovascular: Positive for palpitations. Negative for chest pain, dyspnea on exertion, irregular heartbeat, leg swelling, near-syncope, orthopnea and syncope.   Respiratory: Negative for cough, shortness of breath and wheezing.    Musculoskeletal: Negative for back pain, joint pain and joint swelling.   Gastrointestinal: Negative for anorexia, heartburn, nausea and vomiting.   Genitourinary: Negative for dysuria, hematuria,  "hesitancy and nocturia.   Neurological: Negative for dizziness, light-headedness and loss of balance.   Psychiatric/Behavioral: Negative for depression and memory loss. The patient is not nervous/anxious.            Objective     /72 (BP Location: Left arm)   Pulse 68   Ht 162.6 cm (64.02\")   Wt 97.1 kg (214 lb)   BMI 36.71 kg/m²     Physical Exam   Constitutional: She is oriented to person, place, and time. She appears well-developed and well-nourished.   HENT:   Head: Normocephalic and atraumatic.   Eyes: EOM are normal. Pupils are equal, round, and reactive to light.   Neck: Normal range of motion. Neck supple.   Cardiovascular: Normal rate and regular rhythm.   Murmur heard.  Pulmonary/Chest: Effort normal and breath sounds normal.   Abdominal: Soft.   Musculoskeletal: Normal range of motion.   Neurological: She is alert and oriented to person, place, and time.   Skin: Skin is warm and dry.   Psychiatric: She has a normal mood and affect. Her behavior is normal.       Procedures    Assessment/Plan     HR and BP are stable today.  HTN is well managed on current.  Patient does admit to some rare palpitations but does not feel they have worsened.  For now, current coreg dosing continued.  For history of CAD and stent placement in LAD, she will continue on DAPT therapy as bleeding and bruising are denied.  No new cardiac workup will be advised at this time as no or worsening cardiac symptoms are denied and most recent workup in 2018 was negative for ischemic burden.  She has remained on statin therapy with pravachol for hyperlipidemia management ant tolerates well.  She reports most recent FLP showed lipids at goal, current dosing advised.  She will discuss further changes with your office. Patient admits most recent AIC with endocrine was elevated at over 8%, no medication changes were advised.  She does admit to watching her diet closer since that time and will have AIC rechecked in April.  She was " counseled to limit her carb intake as well as caloric intake and engage in a walking regimen of 30-40 minutes 3-4 times weekly as BMI remains elevated over 35.  6 month follow up advised or sooner if needed.  Refills sent per request.        Problems Addressed this Visit        Cardiovascular and Mediastinum    CAD (coronary artery disease)    Relevant Medications    carvedilol (COREG) 3.125 MG tablet    prasugrel (EFFIENT) 10 MG tablet    HTN (hypertension)    Relevant Medications    carvedilol (COREG) 3.125 MG tablet    triamterene-hydrochlorothiazide (DYAZIDE) 37.5-25 MG per capsule    Hyperlipemia    Relevant Medications    pravastatin (PRAVACHOL) 40 MG tablet       Endocrine    Type 2 diabetes mellitus without complication, without long-term current use of insulin (CMS/[facility])       Other    History of placement of stent in LAD coronary artery - Primary      Other Visit Diagnoses     Palpitations        Obesity (BMI 30-39.9)              Patient's Body mass index is 36.71 kg/m². BMI is above normal parameters. Recommendations include: nutrition counseling.            Electronically signed by JULIAN Andrade March 11, 2019 4:29 PM

## 2019-09-09 ENCOUNTER — OFFICE VISIT (OUTPATIENT)
Dept: CARDIOLOGY | Facility: CLINIC | Age: 64
End: 2019-09-09

## 2019-09-09 VITALS
SYSTOLIC BLOOD PRESSURE: 104 MMHG | DIASTOLIC BLOOD PRESSURE: 66 MMHG | WEIGHT: 201 LBS | HEIGHT: 64 IN | HEART RATE: 72 BPM | BODY MASS INDEX: 34.31 KG/M2

## 2019-09-09 DIAGNOSIS — I10 ESSENTIAL HYPERTENSION: ICD-10-CM

## 2019-09-09 DIAGNOSIS — Z95.5 HISTORY OF PLACEMENT OF STENT IN LAD CORONARY ARTERY: ICD-10-CM

## 2019-09-09 DIAGNOSIS — D50.8 OTHER IRON DEFICIENCY ANEMIA: ICD-10-CM

## 2019-09-09 DIAGNOSIS — E11.9 TYPE 2 DIABETES MELLITUS WITHOUT COMPLICATION, WITHOUT LONG-TERM CURRENT USE OF INSULIN (HCC): ICD-10-CM

## 2019-09-09 DIAGNOSIS — E78.2 MIXED HYPERLIPIDEMIA: ICD-10-CM

## 2019-09-09 DIAGNOSIS — I25.10 CORONARY ARTERY DISEASE INVOLVING NATIVE CORONARY ARTERY OF NATIVE HEART WITHOUT ANGINA PECTORIS: Primary | ICD-10-CM

## 2019-09-09 PROCEDURE — 99214 OFFICE O/P EST MOD 30 MIN: CPT | Performed by: NURSE PRACTITIONER

## 2019-09-09 RX ORDER — IRON POLYSACCHARIDE COMPLEX 150 MG
150 CAPSULE ORAL 2 TIMES DAILY
COMMUNITY
End: 2020-11-17

## 2019-09-09 NOTE — PROGRESS NOTES
Chief Complaint   Patient presents with   • Follow-up     Cardiac management. She was Dx with Anemia in May. She does not need refills at this time. Had colonscopy in June, had small colon repair at that time.   • Lab     Has copy of most recent labs.   • Dizziness     Noticed more with the Anemia.   • Palpitations     She had frequently for a week and noticed she had been drinking a cup of tea every morning when the palpitations was noticed. She stopped drinking tea and palpitations stopped.       Subjective       Paige Gibbs is a 64 y.o. female with HTN, hyperlipidemia, DM, and IHD diagnosed in 2011 when she underwent stenting of the LAD and then in 2012 underwent stenting of the ramus. Cardiac work up in 2014 and again in 2018 showed no ischemia and normal LV function. She came today for follow up. Denies chest pain or shortness of breath. She has been diagnosed with anemia following closely with GI. EGD and colonoscopy have been done also had the pill cam. So far, unable to find cause. She plans to follow up this week with Dr. Pitts for the results. She denies obvious bleeding, no melena or dark stool. Labs on 7/25/19 showed RBC 4.35, H/H 9.7/32.5, MCV 74.7, MCH 22.3, RDW 19.9, glucose 108, BUN/Cr 28/0.8, GFR 76. She reports A1C in July was 6.7%. She is taking iron supplement. Lipids not available.    HPI         Cardiac History:    Past Surgical History:   Procedure Laterality Date   • CARDIAC CATHETERIZATION  08/12/2011    75% LAD- 2.75x18 Promus.   • CARDIAC CATHETERIZATION  05/25/2012    90% Ramus- 2.25x18 Promus   • CARDIOVASCULAR STRESS TEST  07/18/2011    3 min, 30 sec. 93% THR. bp-164/72. R/O anteroseptal ischemia   • CARDIOVASCULAR STRESS TEST  05/14/2012    4 min, 91% THR, 100/54, septal ischemi   • CARDIOVASCULAR STRESS TEST  12/14/2012    4 min, 90% THR>BP-150/70. Negative   • CARDIOVASCULAR STRESS TEST  08/26/2014    6 min, 94% THR, 132/64, negative for ischemia   • CARDIOVASCULAR STRESS TEST   01/10/2018    L.Cardiolite- EF > 65%. R/O Breast attenuation   • CARDIOVASCULAR STRESS TEST  01/10/2018    Lexiscan- no definite ischemia noted   • CONVERTED (HISTORICAL) HOLTER  12/05/2012    AVG HR 76 BPM. Rare PVC's   • ECHO - CONVERTED  08/02/2011    >60%   • ECHO - CONVERTED  05/14/2012    EF 65-70%   • ECHO - CONVERTED  08/26/2014    EF 65%, RVSP 26 mmHg   • ECHO - CONVERTED  01/10/2018    EF 65%. RVSP- 30 mmHg   • ECHO - CONVERTED  01/10/2018    EF 60-65%, no AS, mild MR, RVSP 30 mmHg   • ECHO - CONVERTED  01/10/2018    EF 60-65%, no AS, mild MR, RVSP 30 mmHg       Current Outpatient Medications   Medication Sig Dispense Refill   • aspirin 81 MG EC tablet Take 81 mg by mouth daily.     • carvedilol (COREG) 3.125 MG tablet Take 1 tablet by mouth Daily. 90 tablet 3   • Insulin Detemir (LEVEMIR FLEXPEN SC) Inject  under the skin Daily.     • Insulin Lispro (HUMALOG KWIKPEN SC) Inject  under the skin. As directed.     • iron polysaccharides (NIFEREX) 150 MG capsule Take 150 mg by mouth 2 (Two) Times a Day.     • levothyroxine (SYNTHROID, LEVOTHROID) 125 MCG tablet Take 125 mcg by mouth daily.     • Magnesium 250 MG tablet Take  by mouth Daily.     • metFORMIN (GLUCOPHAGE) 1000 MG tablet Take 1,000 mg by mouth 2 (two) times a day with meals.     • omeprazole (PriLOSEC) 20 MG capsule Take 20 mg by mouth daily.     • pramipexole (MIRAPEX) 0.125 MG tablet Take 0.125 mg by mouth As Needed.     • prasugrel (EFFIENT) 10 MG tablet Take 1 tablet by mouth Daily. 90 tablet 3   • pravastatin (PRAVACHOL) 40 MG tablet Take 1 tablet by mouth Daily. 90 tablet 3   • traZODone (DESYREL) 100 MG tablet Take 100 mg by mouth every night.     • triamterene-hydrochlorothiazide (DYAZIDE) 37.5-25 MG per capsule Take 1 capsule by mouth Every Morning. 90 capsule 3   • Vitamin D, Cholecalciferol, 1000 UNITS tablet Take  by mouth daily.       No current facility-administered medications for this visit.        Codeine; Farxiga [dapagliflozin];  Invokana [canagliflozin]; and Tresiba flextouch [insulin degludec]    Past Medical History:   Diagnosis Date   • Anemia 2019   • Diabetes mellitus (CMS/HCC)    • GERD (gastroesophageal reflux disease)    • H/O foot surgery     left and right foot   • History of cholecystectomy    • History of tonsillectomy    • Hyperlipidemia    • Hypothyroidism    • Pancreatitis    • Vitamin D deficiency      Social History     Socioeconomic History   • Marital status:      Spouse name: Not on file   • Number of children: Not on file   • Years of education: Not on file   • Highest education level: Not on file   Tobacco Use   • Smoking status: Former Smoker     Last attempt to quit:      Years since quittin.7   • Smokeless tobacco: Never Used   Substance and Sexual Activity   • Alcohol use: No   • Drug use: No   • Sexual activity: Defer     Family History   Problem Relation Age of Onset   • Heart attack Mother         first MI at age 28   • Heart disease Father         CABG at age 56. 1-2 stents after that       Review of Systems   Constitution: Positive for weight loss (down 13 lb, diet/limiting carbs). Negative for decreased appetite, weakness and malaise/fatigue.   HENT: Negative.    Eyes: Negative.    Cardiovascular: Positive for palpitations (associated with increased caffeine intake). Negative for chest pain, dyspnea on exertion, leg swelling and syncope.   Respiratory: Negative for cough and shortness of breath.    Endocrine: Negative.    Hematologic/Lymphatic: Negative.    Skin: Negative.    Musculoskeletal: Negative for falls and myalgias.   Gastrointestinal: Negative for abdominal pain, melena and nausea.   Genitourinary: Negative for dysuria and hematuria.   Neurological: Negative for dizziness.   Psychiatric/Behavioral: Negative for altered mental status and depression.   Allergic/Immunologic: Negative.       Diabetes- Yes  Thyroid-normal    Objective     /66 (BP Location: Right arm)   Pulse 72   " Ht 162.6 cm (64.02\")   Wt 91.2 kg (201 lb)   BMI 34.48 kg/m²     Physical Exam   Constitutional: She is oriented to person, place, and time. She appears well-developed and well-nourished. No distress.   HENT:   Head: Normocephalic.   Eyes: Pupils are equal, round, and reactive to light.   Neck: Normal range of motion.   Cardiovascular: Normal rate, regular rhythm and intact distal pulses.   Murmur heard.  Pulmonary/Chest: Effort normal and breath sounds normal. No respiratory distress.   Abdominal: Soft. Bowel sounds are normal. She exhibits no distension.   Musculoskeletal: Normal range of motion. She exhibits no edema.   Neurological: She is alert and oriented to person, place, and time.   Skin: Skin is warm and dry. She is not diaphoretic.   Psychiatric: She has a normal mood and affect.   Nursing note and vitals reviewed.     Procedures          Assessment/Plan    Paige was seen today for follow-up, lab, dizziness and palpitations.    Diagnoses and all orders for this visit:    Coronary artery disease involving native coronary artery of native heart without angina pectoris    Essential hypertension    Mixed hyperlipidemia    Type 2 diabetes mellitus without complication, without long-term current use of insulin (CMS/[facility])    History of placement of stent in LAD coronary artery    Other iron deficiency anemia    1. CAD- s/p stenting LAD, 2011, s/p stenting ramus, 2012. Negative stress, 2014, 2018. She denies anginal symptoms. Continue aspirin, Effient, beta blocker, statin.     2. HTN- well controlled, continue triamterene/HCTZ, Coreg. Limit sodium 1500 mg daily. Continue weight loss efforts.     3. Hyperlipidemia- lipids followed with Dr. Fox. Tolerates pravastatin, continue the same with goal of LDL 70.     4. Diabetes- well controlled, A1C reported as 6.7%. Continue aspirin, statin, she is not on ACE or ARB.    5. MARY BETH- CBC shows microcytic/hypochromic anemia. She is on iron supplementation. She will " follow up with Dr. Pitts this week for results of pill cam. She may need to stop Effient until hgb is stabilized. She is greater than one year post stent and diabetes well controlled. Can hold Effient and continue aspirin if you feel this is necessary.     She appears stable from a cardiac standpoint. Labs and refills followed with Dr. Stoddard. No new cardiac work up ordered today. We will see her back in six months or sooner if needed.     Patient's Body mass index is 34.48 kg/m². BMI is above normal parameters. Recommendations include: nutrition counseling.               Electronically signed by JULIAN Isaac,  September 11, 2019 8:57 AM

## 2019-09-11 PROBLEM — D50.9 IRON DEFICIENCY ANEMIA: Status: ACTIVE | Noted: 2019-09-11

## 2020-03-06 DIAGNOSIS — I10 ESSENTIAL HYPERTENSION: ICD-10-CM

## 2020-03-06 RX ORDER — CARVEDILOL 3.12 MG/1
TABLET ORAL
Qty: 90 TABLET | Refills: 0 | Status: SHIPPED | OUTPATIENT
Start: 2020-03-06 | End: 2020-03-23 | Stop reason: SDUPTHER

## 2020-03-23 ENCOUNTER — OFFICE VISIT (OUTPATIENT)
Dept: CARDIOLOGY | Facility: CLINIC | Age: 65
End: 2020-03-23

## 2020-03-23 VITALS
HEIGHT: 64 IN | SYSTOLIC BLOOD PRESSURE: 126 MMHG | BODY MASS INDEX: 32.91 KG/M2 | HEART RATE: 70 BPM | WEIGHT: 192.8 LBS | DIASTOLIC BLOOD PRESSURE: 70 MMHG

## 2020-03-23 DIAGNOSIS — E78.2 MIXED HYPERLIPIDEMIA: ICD-10-CM

## 2020-03-23 DIAGNOSIS — Z95.5 HISTORY OF PLACEMENT OF STENT IN LAD CORONARY ARTERY: ICD-10-CM

## 2020-03-23 DIAGNOSIS — I10 ESSENTIAL HYPERTENSION: ICD-10-CM

## 2020-03-23 DIAGNOSIS — I25.10 CORONARY ARTERY DISEASE INVOLVING NATIVE CORONARY ARTERY OF NATIVE HEART WITHOUT ANGINA PECTORIS: ICD-10-CM

## 2020-03-23 PROCEDURE — 99213 OFFICE O/P EST LOW 20 MIN: CPT | Performed by: NURSE PRACTITIONER

## 2020-03-23 RX ORDER — CARVEDILOL 3.12 MG/1
3.12 TABLET ORAL DAILY
Qty: 90 TABLET | Refills: 3 | Status: SHIPPED | OUTPATIENT
Start: 2020-03-23 | End: 2021-03-30 | Stop reason: SDUPTHER

## 2020-03-23 NOTE — PROGRESS NOTES
Chief Complaint   Patient presents with   • Follow-up     Cardiac management . Has current labs with her today. Has scheduled to see PCP later this week   • Med Refill     Needs refills on Coreg 90 day supply to [facility] . Had medication list today       Subjective       Paige Gibbs is a 64 y.o. female with HTN, hyperlipidemia, DM, and IHD diagnosed in 2011 when she underwent stenting of the LAD and then in 2012 underwent stenting of the ramus. Cardiac work up in 2014 and again in 2018 showed no ischemia and normal LV function. She follows with Dr. Calloway in [place] for diabetic management    Today she comes to the office for a follow-up visit.  She has been following a keto diet as advised by her endocrinologist.  Her weight is down about 10 pounds.  Most recent labs showed improvement of A1c as well as lipid panel.  Overall, she says she is feeling very well.  She does plan to begin regular exercise.  Currently no chest pain, palpitations, or issues with shortness of breath noted.  The dose of insulin has been decreased.  No change in cardiac medications noted.  Her anemia has improved since discontinuation of Effient.  She maintains daily low-dose aspirin without issue.      HPI     Cardiac History:    Past Surgical History:   Procedure Laterality Date   • CARDIAC CATHETERIZATION  08/12/2011    75% LAD- 2.75x18 Promus.   • CARDIAC CATHETERIZATION  05/25/2012    90% Ramus- 2.25x18 Promus   • CARDIOVASCULAR STRESS TEST  07/18/2011    3 min, 30 sec. 93% THR. bp-164/72. R/O anteroseptal ischemia   • CARDIOVASCULAR STRESS TEST  05/14/2012    4 min, 91% THR, 100/54, septal ischemi   • CARDIOVASCULAR STRESS TEST  12/14/2012    4 min, 90% THR>BP-150/70. Negative   • CARDIOVASCULAR STRESS TEST  08/26/2014    6 min, 94% THR, 132/64, negative for ischemia   • CARDIOVASCULAR STRESS TEST  01/10/2018    L.Cardiolite- EF > 65%. R/O Breast attenuation   • CARDIOVASCULAR STRESS TEST  01/10/2018    Lexiscan- no definite  ischemia noted   • CONVERTED (HISTORICAL) HOLTER  12/05/2012    AVG HR 76 BPM. Rare PVC's   • ECHO - CONVERTED  08/02/2011    >60%   • ECHO - CONVERTED  05/14/2012    EF 65-70%   • ECHO - CONVERTED  08/26/2014    EF 65%, RVSP 26 mmHg   • ECHO - CONVERTED  01/10/2018    EF 65%. RVSP- 30 mmHg   • ECHO - CONVERTED  01/10/2018    EF 60-65%, no AS, mild MR, RVSP 30 mmHg   • ECHO - CONVERTED  01/10/2018    EF 60-65%, no AS, mild MR, RVSP 30 mmHg       Current Outpatient Medications   Medication Sig Dispense Refill   • aspirin 81 MG EC tablet Take 81 mg by mouth daily.     • carvedilol (COREG) 3.125 MG tablet Take 1 tablet by mouth Daily. 90 tablet 3   • Insulin Detemir (LEVEMIR FLEXPEN SC) Inject  under the skin Daily.     • iron polysaccharides (NIFEREX) 150 MG capsule Take 150 mg by mouth 2 (Two) Times a Day.     • levothyroxine (SYNTHROID, LEVOTHROID) 125 MCG tablet Take 125 mcg by mouth daily.     • Magnesium 250 MG tablet Take  by mouth Daily.     • metFORMIN (GLUCOPHAGE) 1000 MG tablet Take 1,000 mg by mouth 2 (two) times a day with meals.     • omeprazole (PriLOSEC) 20 MG capsule Take 20 mg by mouth daily.     • pravastatin (PRAVACHOL) 40 MG tablet Take 1 tablet by mouth Daily. 90 tablet 3   • traZODone (DESYREL) 100 MG tablet Take 100 mg by mouth every night.     • triamterene-hydrochlorothiazide (DYAZIDE) 37.5-25 MG per capsule Take 1 capsule by mouth Every Morning. 90 capsule 3   • Vitamin D, Cholecalciferol, 1000 UNITS tablet Take  by mouth daily.       No current facility-administered medications for this visit.        Codeine; Farxiga [dapagliflozin]; Invokana [canagliflozin]; and Tresiba flextouch [insulin degludec]    Past Medical History:   Diagnosis Date   • Anemia 05/2019   • Diabetes mellitus (CMS/HCC)    • GERD (gastroesophageal reflux disease)    • H/O foot surgery     left and right foot   • History of cholecystectomy    • History of tonsillectomy    • Hyperlipidemia    • Hypothyroidism    •  Pancreatitis    • Vitamin D deficiency        Social History     Socioeconomic History   • Marital status:      Spouse name: Not on file   • Number of children: Not on file   • Years of education: Not on file   • Highest education level: Not on file   Tobacco Use   • Smoking status: Former Smoker     Last attempt to quit: 1986     Years since quittin.2   • Smokeless tobacco: Never Used   Substance and Sexual Activity   • Alcohol use: No   • Drug use: No   • Sexual activity: Defer       Family History   Problem Relation Age of Onset   • Heart attack Mother         first MI at age 28   • Heart disease Father         CABG at age 56. 1-2 stents after that       Review of Systems   Constitution: Positive for weight loss (by diet). Negative for decreased appetite, fever and malaise/fatigue.   HENT: Negative.    Eyes: Negative for redness and visual disturbance.   Cardiovascular: Negative for chest pain, dyspnea on exertion, leg swelling, near-syncope and palpitations.   Respiratory: Negative for cough, shortness of breath and sleep disturbances due to breathing.    Endocrine: Negative for polydipsia, polyphagia and polyuria.   Hematologic/Lymphatic: Negative.    Skin: Negative.    Musculoskeletal: Negative for muscle cramps and muscle weakness.   Gastrointestinal: Positive for constipation (uses Miralax). Negative for bloating, abdominal pain, melena and nausea.   Genitourinary: Negative.    Neurological: Negative for dizziness, headaches and weakness.   Psychiatric/Behavioral: The patient does not have insomnia and is not nervous/anxious.    Allergic/Immunologic: Negative for hives and persistent infections.        Objective      Labs 2019: A1c 6, WBC 7.4, RBC 4.88, hemoglobin 13.3, hematocrit 40.8, platelets 213, total cholesterol 138, HDL 65, triglycerides 141, LDL 45, total cholesterol/HDL ratio 2.1, non-HDL cholesterol 73, fasting glucose 97.    7/25/19 showed RBC 4.35, H/H 9.7/32.5, MCV 74.7, MCH  "22.3, RDW 19.9, glucose 108, BUN/Cr 28/0.8, GFR 76. She reports A1C in July was 6.7%.    /70 (BP Location: Left arm)   Pulse 70   Ht 162.6 cm (64.02\")   Wt 87.5 kg (192 lb 12.8 oz)   BMI 33.07 kg/m²     Physical Exam   Constitutional: She is oriented to person, place, and time. She appears well-nourished.   HENT:   Head: Normocephalic.   Eyes: Pupils are equal, round, and reactive to light. Conjunctivae are normal.   Neck: Normal range of motion. Neck supple. Carotid bruit is not present.   Cardiovascular: Normal rate, regular rhythm, S1 normal and S2 normal.   No murmur heard.  Pulses:       Radial pulses are 2+ on the right side, and 2+ on the left side.   Pulmonary/Chest: Breath sounds normal. She has no rales.   Abdominal: Soft. Bowel sounds are normal. She exhibits no distension. There is no tenderness.   Musculoskeletal: Normal range of motion. She exhibits no edema.   Neurological: She is alert and oriented to person, place, and time.   Skin: Skin is warm and dry.   Psychiatric: She has a normal mood and affect. Her behavior is normal.        Procedures: none today         Assessment/Plan      Paige was seen today for follow-up and med refill.    Diagnoses and all orders for this visit:    Coronary artery disease involving native coronary artery of native heart without angina pectoris    History of placement of stent in LAD coronary artery    Essential hypertension  -     carvedilol (COREG) 3.125 MG tablet; Take 1 tablet by mouth Daily.    Mixed hyperlipidemia      CAD- patient presents today without cardiac complaints or concerns.  Her stress testing in 2018 was negative for ischemia and showed noemal LVEF.  Currently her cardiac risk factors are better managed including diabetes.  No repeat cardiac testing ordered at this time.  She is off Effient and anemia has improved.  Continue daily aspirin.    Hypertension- her blood pressure today is normal.  Continue to monitor.  Refills given for " carvedilol 3.125 mg twice a day.  She is also on triamterene/hydrochlorothiazide 37.5-25 mg daily.    Hyperlipidemia- recent lipid panel at goal.  Continue pravastatin 40 mg daily.    Patient's Body mass index is 33.07 kg/m². BMI is above normal parameters. Recommendations include: nutrition counseling.  Currently on keto diet, managed by endocrinologist.  I complemented her on weight loss.  Information on benefit of exercise given to her.     Paige Gibbs  reports that she quit smoking about 34 years ago. She has never used smokeless tobacco.     A 6-month follow-up visit scheduled.  Please call sooner for any cardiac concerns.           Electronically signed by JULIAN Phillips,  March 23, 2020 09:46

## 2020-03-23 NOTE — PATIENT INSTRUCTIONS
Physical Activity With Heart Disease  Being active has many benefits, especially if you have heart disease. Physical activity can help you do more and feel healthier. Start slowly, and increase the amount of time you spend being active. Most adults should aim for physical activity that:  · Makes you breathe harder and raises your heart rate (aerobic activity). Try to get at least 150 minutes of aerobic activity each week. This is about 30 minutes each day, 5 days a week.  · Helps build muscle strength (strengthening activity). Do this at least 2 times a week.  Always talk with your health care provider before starting any new activity program or if you have any changes in your condition.  What are the benefits of physical activity?  When you have heart disease, physical activity can help:  · Lower your blood pressure.  · Lower your cholesterol.  · Control your weight.  · Improve your sleep.  · Help control your blood sugar.  · Improve your heart and lung function.  · Reduce your risk for blood clots (thrombophlebitis).  · Improve your energy level.  · Reduce stress.  What are some types of physical activity I could try?  There are many ways to be active. Talk with your health care provider about what types and intensity of activity is right for you.  Aerobic activity    Aerobic (cardiovascular) activity can be moderate or vigorous intensity, depending on how hard you are working.  Moderate-intensity activity includes:  · Walking.  · Slow bicycling.  · Water aerobics.  · Dancing.  · Light gardening or house work.  Vigorous-intensity activity includes:  · Jogging or running.  · Stair climbing.  · Swimming laps.  · Hiking uphill.  · Heavy gardening, such as digging trenches.    Strengthening activity  Strengthening activities work your muscles to build strength. Some examples include:  · Doing push-ups, sit-ups, or pull-ups.  · Lifting small weights.  · Using resistance bands.    Flexibility  Flexibility activities  lengthen your muscles to keep them flexible and less tight and improve your balance. Some examples include:  · Stretching.  · Yoga.  · Sachin chi.  · Ballet barre.    Follow these instructions at home:  How to get started  · Talk with your health care provider about:  ? What types of activities are safe for you.  ? If you should check your pulse or take other precautions during physical activity.  · Get a calendar. Write down a schedule and plan for your new routine.  · Take time to find out what works for you. Consider:  ? Joining a community program, such as a biking group, yoga class, local gym, or swimming pool membership.  ? Be active on your own by downloading free workout applications on a smartphone or other devices, or by purchasing workout DVDs.  · If you have not been active, begin with sessions that last 10-15 minutes. Gradually work up to sessions that last 20-30 minutes, 5 times a week. Follow all of your health care provider's recommendations.  · Be patient with yourself. It takes time to build up strength and lung capacity.  Safety  · Exercise in an indoor, climate-controlled facility, as told by your health care provider. You may need to do this if:  ? There are extreme outdoor conditions, such as heat, humidity, or cold.  ? There is an air pollution advisory. Your local news, board of health, or hospital can provide information on air quality.  · Take extra precautions as told by your health care provider. This may include:  ? Monitoring your heart rate.  ? Avoiding heavy lifting.  ? Understanding how your medicines can affect you during physical activity. Certain medicines may cause heat intolerance or changes in blood sugar.  ? Slowing down to rest when you need to.  ? Keeping nitroglycerin spray and tablets with you at all times if you have angina. Use them as told to prevent and treat symptoms.  · Drink plenty of water before, during, and after physical activity.  · Know what symptoms may be signs  of a problem. Stop physical activity right away if you have any of these symptoms.  Get help right away if you have any of the following during exercise:  · Chest pain, shortness of breath, or feel very tired.  · Pain in the arm, shoulder, neck, or jaw.  · Feel weak, dizzy, or light-headed.  · An irregular heart rate, or your heart rate is greater than 100 beats per minute (bpm) before exercise.  These symptoms may represent a serious problem that is an emergency. Do not wait to see if the symptoms go away. Get medical help right away. Call your local emergency services (911 in the [place] Do not drive yourself to the hospital.   Summary  · Physical activity has many benefits, especially if you have heart disease.  · Before starting an activity program, talk with your health care provider about how often to be active and what type of activity is safe for you.  · Your physical activity plan may include moderate or vigorous aerobic activity, strengthening activities, and flexibility.  · Know what symptoms may be signs of a problem. Stop physical activity right away and call emergency services (911 in the [place] if you have any of these symptoms.  This information is not intended to replace advice given to you by your health care provider. Make sure you discuss any questions you have with your health care provider.  Document Released: 07/15/2015 Document Revised: 01/09/2019 Document Reviewed: 01/09/2019  [employer] Patient Education © 2020 [employer]

## 2020-05-04 RX ORDER — TRIAMTERENE AND HYDROCHLOROTHIAZIDE 37.5; 25 MG/1; MG/1
CAPSULE ORAL
Qty: 90 CAPSULE | Refills: 1 | Status: SHIPPED | OUTPATIENT
Start: 2020-05-04 | End: 2020-09-24 | Stop reason: SDUPTHER

## 2020-05-14 RX ORDER — PRAVASTATIN SODIUM 40 MG
TABLET ORAL
Qty: 90 TABLET | Refills: 3 | Status: SHIPPED | OUTPATIENT
Start: 2020-05-14 | End: 2021-03-30 | Stop reason: SDUPTHER

## 2020-09-24 ENCOUNTER — OFFICE VISIT (OUTPATIENT)
Dept: CARDIOLOGY | Facility: CLINIC | Age: 65
End: 2020-09-24

## 2020-09-24 VITALS
TEMPERATURE: 97.7 F | HEIGHT: 64 IN | BODY MASS INDEX: 33.57 KG/M2 | WEIGHT: 196.6 LBS | HEART RATE: 74 BPM | SYSTOLIC BLOOD PRESSURE: 140 MMHG | DIASTOLIC BLOOD PRESSURE: 72 MMHG

## 2020-09-24 DIAGNOSIS — I10 ESSENTIAL HYPERTENSION: ICD-10-CM

## 2020-09-24 DIAGNOSIS — E78.2 MIXED HYPERLIPIDEMIA: ICD-10-CM

## 2020-09-24 DIAGNOSIS — I25.10 CORONARY ARTERY DISEASE INVOLVING NATIVE CORONARY ARTERY OF NATIVE HEART WITHOUT ANGINA PECTORIS: Primary | ICD-10-CM

## 2020-09-24 DIAGNOSIS — D50.8 OTHER IRON DEFICIENCY ANEMIA: ICD-10-CM

## 2020-09-24 DIAGNOSIS — E11.9 TYPE 2 DIABETES MELLITUS WITHOUT COMPLICATION, WITHOUT LONG-TERM CURRENT USE OF INSULIN (HCC): ICD-10-CM

## 2020-09-24 DIAGNOSIS — Z95.5 HISTORY OF PLACEMENT OF STENT IN LAD CORONARY ARTERY: ICD-10-CM

## 2020-09-24 PROCEDURE — 99213 OFFICE O/P EST LOW 20 MIN: CPT | Performed by: NURSE PRACTITIONER

## 2020-09-24 RX ORDER — TRIAMTERENE AND HYDROCHLOROTHIAZIDE 37.5; 25 MG/1; MG/1
1 CAPSULE ORAL EVERY MORNING
Qty: 90 CAPSULE | Refills: 2 | Status: SHIPPED | OUTPATIENT
Start: 2020-09-24 | End: 2021-03-30 | Stop reason: SDUPTHER

## 2020-09-24 RX ORDER — LANOLIN ALCOHOL/MO/W.PET/CERES
1000 CREAM (GRAM) TOPICAL DAILY
COMMUNITY
End: 2022-06-16 | Stop reason: ALTCHOICE

## 2020-10-06 DIAGNOSIS — E11.9 TYPE 2 DIABETES MELLITUS WITHOUT COMPLICATION, WITHOUT LONG-TERM CURRENT USE OF INSULIN (HCC): Primary | ICD-10-CM

## 2020-10-06 RX ORDER — INSULIN DETEMIR 100 [IU]/ML
INJECTION, SOLUTION SUBCUTANEOUS
COMMUNITY
End: 2021-05-04 | Stop reason: SDUPTHER

## 2020-10-06 NOTE — TELEPHONE ENCOUNTER
PATIENT NEEDS REFILLS ON LEVEMIR FLEXPEN PRESCRIPTION. SHE HAS ABOUT 2 WEEKS WORTH OF MEDICATION LEFT.

## 2020-10-06 NOTE — TELEPHONE ENCOUNTER
Patient last seen 06/23/2020.  Scheduled follow up 12/23/2020.  Left message for patient to return call.  Need to clarify current dose and sig.

## 2020-11-17 ENCOUNTER — OFFICE VISIT (OUTPATIENT)
Dept: ENDOCRINOLOGY | Facility: CLINIC | Age: 65
End: 2020-11-17

## 2020-11-17 VITALS
HEART RATE: 73 BPM | OXYGEN SATURATION: 98 % | RESPIRATION RATE: 16 BRPM | WEIGHT: 192 LBS | SYSTOLIC BLOOD PRESSURE: 120 MMHG | BODY MASS INDEX: 32.78 KG/M2 | DIASTOLIC BLOOD PRESSURE: 70 MMHG | TEMPERATURE: 97.1 F | HEIGHT: 64 IN

## 2020-11-17 DIAGNOSIS — Z79.4 TYPE 2 DIABETES MELLITUS WITHOUT COMPLICATION, WITH LONG-TERM CURRENT USE OF INSULIN (HCC): Primary | ICD-10-CM

## 2020-11-17 DIAGNOSIS — E11.9 TYPE 2 DIABETES MELLITUS WITHOUT COMPLICATION, WITH LONG-TERM CURRENT USE OF INSULIN (HCC): Primary | ICD-10-CM

## 2020-11-17 DIAGNOSIS — E03.9 ACQUIRED HYPOTHYROIDISM: ICD-10-CM

## 2020-11-17 PROCEDURE — 99213 OFFICE O/P EST LOW 20 MIN: CPT | Performed by: INTERNAL MEDICINE

## 2020-11-17 NOTE — ASSESSMENT & PLAN NOTE
tsh slightly low but no symptoms or signs of over treatment. I recommend  No changes to her treatment plan

## 2020-11-17 NOTE — PROGRESS NOTES
"     Office Note      Date: 2020  Patient Name: Paige Gibbs  MRN: [id number]  : 1955    Chief Complaint   Patient presents with   • Diabetes     A1C completed 20-6.3       History of Present Illness:   Paige Gibbs is a 65 y.o. female who presents for Diabetes type 2  Duration - 16 years  Context- occurred in the setting of acute pancreatitis.  Complications  None  Associated conditions- hypothyroidism high cholesterol, overweight- all stable     Has an intermittent burning pain on the  Top of her  Left foot.     Changes in health since last visit: see above . Last eye exam  Up to date .    Subjective          Review of Systems:   Review of Systems   Constitutional: Negative.    HENT: Negative.    Eyes: Negative.    Respiratory: Negative.    Cardiovascular: Negative.    Gastrointestinal: Negative.    Endocrine: Negative.    Genitourinary: Negative.    Musculoskeletal: Negative.    Skin: Negative.    Allergic/Immunologic: Negative.    Neurological: Negative.    Hematological: Negative.    Psychiatric/Behavioral: Negative.        The following portions of the patient's history were reviewed and updated as appropriate: allergies, current medications, past family history, past medical history, past social history, past surgical history and problem list.    Objective     Visit Vitals  /70   Pulse 73   Temp 97.1 °F (36.2 °C) (Temporal)   Resp 16   Ht 162.6 cm (64.02\")   Wt 87.1 kg (192 lb)   SpO2 98%   BMI 32.94 kg/m²       Labs:    CMP  No results found for: GLUCOSE, BUN, CREATININE, EGFRIFNONA, EGFRIFAFRI, BCR, K, CO2, CALCIUM, PROTENTOTREF, LABIL2, BILIRUBIN, AST, ALT     CBC w/DIFF  No results found for: WBC, RBC, HGB, HCT, MCV, MCH, MCHC, RDW, RDWSD, MPV, PLT, NEUTRORELPCT, LYMPHORELPCT, MONORELPCT, EOSRELPCT, BASORELPCT, AUTOIGPER, NEUTROABS, LYMPHSABS, MONOSABS, EOSABS, BASOSABS, AUTOIGNUM, NRBC    Physical Exam:  Physical Exam  Vitals signs reviewed.   Constitutional:       " Appearance: Normal appearance. She is obese.   HENT:      Head: Normocephalic and atraumatic.   Cardiovascular:      Pulses:           Dorsalis pedis pulses are 2+ on the right side and 2+ on the left side.        Posterior tibial pulses are 2+ on the right side and 2+ on the left side.   Pulmonary:      Effort: Pulmonary effort is normal.      Breath sounds: Normal breath sounds.   Musculoskeletal:      Right foot: Normal range of motion. No deformity, bunion, Charcot foot, foot drop or prominent metatarsal heads.      Left foot: Normal range of motion. No deformity, bunion, Charcot foot, foot drop or prominent metatarsal heads.   Feet:      Right foot:      Protective Sensation: 10 sites tested. 10 sites sensed.      Skin integrity: Skin integrity normal.      Toenail Condition: Right toenails are normal.      Left foot:      Protective Sensation: 10 sites tested. 10 sites sensed.      Skin integrity: Skin integrity normal.      Toenail Condition: Left toenails are normal.      Comments: Diabetic Foot Exam Performed and Monofilament Test Performed  Neurological:      General: No focal deficit present.      Mental Status: She is alert. Mental status is at baseline.   Psychiatric:         Mood and Affect: Mood normal.         Thought Content: Thought content normal.         Judgment: Judgment normal.          Assessment / Plan      Assessment & Plan:  Problem List Items Addressed This Visit        Endocrine    Type 2 diabetes mellitus without complication, with long-term current use of insulin (CMS/[facility]) - Primary    Current Assessment & Plan     a1c of 6.3 is at goal and represents good control. No changes are needed . Same meds prescribed.         Relevant Medications    metFORMIN (GLUCOPHAGE) 1000 MG tablet    insulin detemir (Levemir FlexTouch) 100 UNIT/ML injection    Acquired hypothyroidism    Current Assessment & Plan     tsh slightly low but no symptoms or signs of over treatment. I recommend  No changes to  her treatment plan          Relevant Medications    levothyroxine (SYNTHROID, LEVOTHROID) 125 MCG tablet    carvedilol (COREG) 3.125 MG tablet           Urban Ram MD   11/17/2020

## 2021-03-30 ENCOUNTER — OFFICE VISIT (OUTPATIENT)
Dept: CARDIOLOGY | Facility: CLINIC | Age: 66
End: 2021-03-30

## 2021-03-30 VITALS
HEIGHT: 64 IN | WEIGHT: 201.2 LBS | HEART RATE: 68 BPM | BODY MASS INDEX: 34.35 KG/M2 | DIASTOLIC BLOOD PRESSURE: 70 MMHG | SYSTOLIC BLOOD PRESSURE: 128 MMHG | TEMPERATURE: 98 F

## 2021-03-30 DIAGNOSIS — E78.2 MIXED HYPERLIPIDEMIA: ICD-10-CM

## 2021-03-30 DIAGNOSIS — E11.9 TYPE 2 DIABETES MELLITUS WITHOUT COMPLICATION, WITH LONG-TERM CURRENT USE OF INSULIN (HCC): ICD-10-CM

## 2021-03-30 DIAGNOSIS — I25.10 CORONARY ARTERY DISEASE INVOLVING NATIVE CORONARY ARTERY OF NATIVE HEART WITHOUT ANGINA PECTORIS: Primary | ICD-10-CM

## 2021-03-30 DIAGNOSIS — I10 ESSENTIAL HYPERTENSION: ICD-10-CM

## 2021-03-30 DIAGNOSIS — Z79.4 TYPE 2 DIABETES MELLITUS WITHOUT COMPLICATION, WITH LONG-TERM CURRENT USE OF INSULIN (HCC): ICD-10-CM

## 2021-03-30 DIAGNOSIS — Z95.5 HISTORY OF PLACEMENT OF STENT IN LAD CORONARY ARTERY: ICD-10-CM

## 2021-03-30 PROCEDURE — 99214 OFFICE O/P EST MOD 30 MIN: CPT | Performed by: NURSE PRACTITIONER

## 2021-03-30 RX ORDER — PRAVASTATIN SODIUM 40 MG
40 TABLET ORAL DAILY
Qty: 90 TABLET | Refills: 3 | Status: SHIPPED | OUTPATIENT
Start: 2021-03-30 | End: 2021-11-17 | Stop reason: SDUPTHER

## 2021-03-30 RX ORDER — CARVEDILOL 3.12 MG/1
3.12 TABLET ORAL DAILY
Qty: 90 TABLET | Refills: 3 | Status: SHIPPED | OUTPATIENT
Start: 2021-03-30 | End: 2021-11-17 | Stop reason: SDUPTHER

## 2021-03-30 RX ORDER — TRIAMTERENE AND HYDROCHLOROTHIAZIDE 37.5; 25 MG/1; MG/1
1 CAPSULE ORAL EVERY MORNING
Qty: 90 CAPSULE | Refills: 3 | Status: SHIPPED | OUTPATIENT
Start: 2021-03-30 | End: 2021-11-17 | Stop reason: SDUPTHER

## 2021-03-30 RX ORDER — SODIUM PHOSPHATE,MONO-DIBASIC 19G-7G/118
ENEMA (ML) RECTAL DAILY
COMMUNITY
End: 2022-06-16

## 2021-03-30 NOTE — PROGRESS NOTES
Chief Complaint   Patient presents with   • Follow-up     Cardiac management   • Lab     Labs fall of last year per PCP.   • Palpitations     Has occasional, not often.   • Med Refill     Needs refills on cardiac medications-90 day.     Subjective       Paige Gibbs is a 65 y.o. female with HTN, hyperlipidemia, DM, and IHD diagnosed in 2011 when she underwent stenting of the LAD and then in 2012 underwent stenting of the ramus. Cardiac work up in 2014 and again in 2018 showed no ischemia and normal LV function. She follows with Dr. Ram in [place] for diabetic management.  Effient was stopped in 2019 secondary to anemia which has improved. Labs 12/2019 H/H 13.3/40.8, A1C 6%, HDL 65, LDL 45, Tri 141.     She returns today for regular follow up. Denies cardiac symptoms. No cp, sob, dizziness, palpitations, fatigue.  Hemoglobin returned to normal and iron therapy was discontinued.  She continues to work full-time but plans to retire in July.  She follows a strict low-carb diet but does note blood sugar was slightly more elevated a few months ago.  Labs followed by Dr. Fox.  Patient reports last A1c up to 7%.  She plans to see him again next month.  Continues to follow with endocrinologist, Dr. Ram.       Cardiac History:    Past Surgical History:   Procedure Laterality Date   • CARDIAC CATHETERIZATION  08/12/2011    75% LAD- 2.75x18 Promus.   • CARDIAC CATHETERIZATION  05/25/2012    90% Ramus- 2.25x18 Promus   • CARDIOVASCULAR STRESS TEST  07/18/2011    3 min, 30 sec. 93% THR. bp-164/72. R/O anteroseptal ischemia   • CARDIOVASCULAR STRESS TEST  05/14/2012    4 min, 91% THR, 100/54, septal ischemi   • CARDIOVASCULAR STRESS TEST  12/14/2012    4 min, 90% THR>BP-150/70. Negative   • CARDIOVASCULAR STRESS TEST  08/26/2014    6 min, 94% THR, 132/64, negative for ischemia   • CARDIOVASCULAR STRESS TEST  01/10/2018    L.Cardiolite- EF > 65%. R/O Breast attenuation   • CARDIOVASCULAR STRESS TEST  01/10/2018     Lexiscan- no definite ischemia noted   • CONVERTED (HISTORICAL) HOLTER  12/05/2012    AVG HR 76 BPM. Rare PVC's   • ECHO - CONVERTED  08/02/2011    >60%   • ECHO - CONVERTED  05/14/2012    EF 65-70%   • ECHO - CONVERTED  08/26/2014    EF 65%, RVSP 26 mmHg   • ECHO - CONVERTED  01/10/2018    EF 65%. RVSP- 30 mmHg   • ECHO - CONVERTED  01/10/2018    EF 60-65%, no AS, mild MR, RVSP 30 mmHg   • ECHO - CONVERTED  01/10/2018    EF 60-65%, no AS, mild MR, RVSP 30 mmHg     Current Outpatient Medications   Medication Sig Dispense Refill   • aspirin 81 MG EC tablet Take 81 mg by mouth daily.     • carvedilol (COREG) 3.125 MG tablet Take 1 tablet by mouth Daily. 90 tablet 3   • glucosamine-chondroitin 500-400 MG capsule capsule Take  by mouth Daily.     • insulin detemir (Levemir FlexTouch) 100 UNIT/ML injection Levemir FlexTouch U-100 Insulin 100 unit/mL (3 mL) subcutaneous pen     • levothyroxine (SYNTHROID, LEVOTHROID) 125 MCG tablet Take 125 mcg by mouth daily.     • Magnesium 250 MG tablet Take  by mouth Daily.     • metFORMIN (GLUCOPHAGE) 1000 MG tablet Take 1,000 mg by mouth 2 (two) times a day with meals.     • omeprazole (PriLOSEC) 20 MG capsule Take 20 mg by mouth daily.     • pravastatin (PRAVACHOL) 40 MG tablet Take 1 tablet by mouth Daily. 90 tablet 3   • traZODone (DESYREL) 100 MG tablet Take 100 mg by mouth every night.     • triamterene-hydrochlorothiazide (DYAZIDE) 37.5-25 MG per capsule Take 1 capsule by mouth Every Morning. 90 capsule 3   • vitamin B-12 (CYANOCOBALAMIN) 1000 MCG tablet Take 1,000 mcg by mouth Daily.     • Vitamin D, Cholecalciferol, 1000 UNITS tablet Take  by mouth daily.       No current facility-administered medications for this visit.     Codeine, Farxiga [dapagliflozin], Invokana [canagliflozin], and Tresiba flextouch [insulin degludec]    Past Medical History:   Diagnosis Date   • Anemia 05/2019   • Diabetes mellitus (CMS/HCC)    • GERD (gastroesophageal reflux disease)    • H/O foot  "surgery     left and right foot   • History of cholecystectomy    • History of tonsillectomy    • Hyperlipidemia    • Hypothyroidism    • Pancreatitis    • Vitamin D deficiency      Social History     Socioeconomic History   • Marital status:      Spouse name: Not on file   • Number of children: Not on file   • Years of education: Not on file   • Highest education level: Not on file   Tobacco Use   • Smoking status: Former Smoker     Quit date:      Years since quittin.2   • Smokeless tobacco: Never Used   Vaping Use   • Vaping Use: Never used   Substance and Sexual Activity   • Alcohol use: No   • Drug use: No   • Sexual activity: Defer       Family History   Problem Relation Age of Onset   • Heart attack Mother         first MI at age 28   • Heart disease Father         CABG at age 56. 1-2 stents after that     Review of Systems   Constitutional: Positive for weight gain (9 pounds). Negative for decreased appetite and malaise/fatigue.   HENT: Negative.    Eyes: Negative for blurred vision.   Cardiovascular: Negative for chest pain, dyspnea on exertion, leg swelling, palpitations and syncope.   Respiratory: Negative for shortness of breath and sleep disturbances due to breathing.    Endocrine: Negative.    Hematologic/Lymphatic: Negative for bleeding problem. Does not bruise/bleed easily.   Skin: Negative.    Musculoskeletal: Negative for falls and myalgias.   Gastrointestinal: Negative for abdominal pain, heartburn and melena.   Genitourinary: Negative for hematuria.   Neurological: Negative for dizziness and light-headedness.   Psychiatric/Behavioral: Negative for altered mental status.   Allergic/Immunologic: Negative.       Objective     /70 (BP Location: Left arm)   Pulse 68   Temp 98 °F (36.7 °C)   Ht 162.6 cm (64.02\")   Wt 91.3 kg (201 lb 3.2 oz)   BMI 34.52 kg/m²     Vitals and nursing note reviewed.   Constitutional:       General: Not in acute distress.     Appearance: " Well-developed. Not diaphoretic.   Eyes:      Pupils: Pupils are equal, round, and reactive to light.   HENT:      Head: Normocephalic.   Pulmonary:      Effort: Pulmonary effort is normal. No respiratory distress.      Breath sounds: Normal breath sounds.   Cardiovascular:      Normal rate. Regular rhythm.   Pulses:     Intact distal pulses.   Abdominal:      General: Bowel sounds are normal.      Palpations: Abdomen is soft.   Musculoskeletal: Normal range of motion.      Cervical back: Normal range of motion. Skin:     General: Skin is warm and dry.   Neurological:      Mental Status: Alert and oriented to person, place, and time.        Procedures          Problem List Items Addressed This Visit        Cardiac and Vasculature    CAD (coronary artery disease) - Primary    Relevant Medications    carvedilol (COREG) 3.125 MG tablet    HTN (hypertension)    Relevant Medications    carvedilol (COREG) 3.125 MG tablet    triamterene-hydrochlorothiazide (DYAZIDE) 37.5-25 MG per capsule    Hyperlipemia    Relevant Medications    pravastatin (PRAVACHOL) 40 MG tablet    History of placement of stent in LAD coronary artery       Endocrine and Metabolic    Type 2 diabetes mellitus without complication, with long-term current use of insulin (CMS/[facility])         1.  CAD-s/p stenting LAD, 2011, s/p stenting ramus, 2012. Negative stress, 2014, 2018. She denies anginal symptoms. Continue aspirin, beta blocker, statin.  Regular walking encouraged.     2. HTN- well controlled, continue triamterene/HCTZ, Coreg. Limit sodium 1500 mg daily.      3. Hyperlipidemia- lipids checked 12/2019 excellent with HDL 65/LDL 45. Continue pravastatin. Heart healthy diet.   Labs have been followed more closely by Dr. Fox, reports good lipid control.      4. Diabetes- A1C increased to 7%.  She temporarily increased insulin but had hypoglycemia and return to normal dosing.  She follows with endocrinology.  Continue aspirin, statin, she is not on ACE or  ARB. If BP increases, will add lisinopril for bp and renovascular protection.      5. MARY BETH-remains improved, no longer taking iron.      She appears stable from cardiac standpoint.  She has no symptoms, so I did not repeat her stress test at this time.  We discussed the risk of silent ischemia.  Advised to report any atypical symptoms.  We will plan to repeat her stress test next year or sooner if needed.     Patient's Body mass index is 34.52 kg/m². BMI is above normal parameters. Recommendations include: nutrition counseling.               Electronically signed by JULIAN Isaac,  March 30, 2021 12:14 EDT

## 2021-05-04 ENCOUNTER — OFFICE VISIT (OUTPATIENT)
Dept: ENDOCRINOLOGY | Facility: CLINIC | Age: 66
End: 2021-05-04

## 2021-05-04 VITALS
BODY MASS INDEX: 34.49 KG/M2 | WEIGHT: 202 LBS | HEART RATE: 80 BPM | OXYGEN SATURATION: 94 % | SYSTOLIC BLOOD PRESSURE: 142 MMHG | DIASTOLIC BLOOD PRESSURE: 74 MMHG | HEIGHT: 64 IN | TEMPERATURE: 98.4 F

## 2021-05-04 DIAGNOSIS — Z79.4 TYPE 2 DIABETES MELLITUS WITHOUT COMPLICATION, WITH LONG-TERM CURRENT USE OF INSULIN (HCC): Primary | ICD-10-CM

## 2021-05-04 DIAGNOSIS — E11.9 TYPE 2 DIABETES MELLITUS WITHOUT COMPLICATION, WITH LONG-TERM CURRENT USE OF INSULIN (HCC): Primary | ICD-10-CM

## 2021-05-04 LAB
EXPIRATION DATE: NORMAL
HBA1C MFR BLD: 6.4 %
Lab: NORMAL

## 2021-05-04 PROCEDURE — 83036 HEMOGLOBIN GLYCOSYLATED A1C: CPT | Performed by: INTERNAL MEDICINE

## 2021-05-04 PROCEDURE — 99213 OFFICE O/P EST LOW 20 MIN: CPT | Performed by: INTERNAL MEDICINE

## 2021-05-04 RX ORDER — INSULIN DETEMIR 100 [IU]/ML
40 INJECTION, SOLUTION SUBCUTANEOUS NIGHTLY
Qty: 45 ML | Refills: 3 | Status: SHIPPED | OUTPATIENT
Start: 2021-05-04 | End: 2022-05-13

## 2021-05-04 NOTE — PROGRESS NOTES
"     Office Note      Date: 2021  Patient Name: Paige Gibbs  MRN: [id number]  : 1955    Chief Complaint   Patient presents with   • Diabetes       History of Present Illness:   Paige Gibbs is a 65 y.o. female who presents for Diabetes - type 2. Diagnosed in the setting of acute pancreatitis  She is on insulin and metformin   Last A1c:6.3  Hemoglobin A1C   Date Value Ref Range Status   2021 6.4 % Final   bg checks are done daily fasting  She has had a few fastings hypos. 63 was the lowest. She was able to treat that herself    Changes in health since last visit: diet and exercise have been rough . Last eye exam  Due  Foot exam is up to date  Full labs are up to date .    Subjective          Review of Systems:   Review of Systems   Constitutional: Negative.    HENT: Negative.    Eyes: Negative.    Respiratory: Negative.        The following portions of the patient's history were reviewed and updated as appropriate: allergies, current medications, past family history, past medical history, past social history, past surgical history and problem list.    Objective     Visit Vitals  /74 (BP Location: Left arm, Patient Position: Sitting, Cuff Size: Adult)   Pulse 80   Temp 98.4 °F (36.9 °C) (Infrared)   Ht 162.6 cm (64\")   Wt 91.6 kg (202 lb)   SpO2 94%   BMI 34.67 kg/m²       Labs:    CMP  No results found for: GLUCOSE, BUN, CREATININE, EGFRIFNONA, EGFRIFAFRI, BCR, K, CO2, CALCIUM, PROTENTOTREF, LABIL2, BILIRUBIN, AST, ALT     CBC w/DIFF  No results found for: WBC, RBC, HGB, HCT, MCV, MCH, MCHC, RDW, RDWSD, MPV, PLT, NEUTRORELPCT, LYMPHORELPCT, MONORELPCT, EOSRELPCT, BASORELPCT, AUTOIGPER, NEUTROABS, LYMPHSABS, MONOSABS, EOSABS, BASOSABS, AUTOIGNUM, NRBC    Physical Exam:  Physical Exam  Vitals reviewed.   Constitutional:       Appearance: Normal appearance.   Neurological:      Mental Status: She is alert and oriented to person, place, and time.   Psychiatric:         Mood and Affect: " Mood normal.         Thought Content: Thought content normal.         Judgment: Judgment normal.          Assessment / Plan      Assessment & Plan:  Problem List Items Addressed This Visit        Other    Type 2 diabetes mellitus without complication, with long-term current use of insulin (CMS/[facility]) - Primary    Current Assessment & Plan     A1c/ diabetes is stable and at goal  No changes are needed          Relevant Medications    metFORMIN (GLUCOPHAGE) 1000 MG tablet    insulin detemir (Levemir FlexTouch) 100 UNIT/ML injection    Other Relevant Orders    POC Glycosylated Hemoglobin (Hb A1C) (Completed)           Urban Ram MD   05/04/2021

## 2021-11-08 ENCOUNTER — OFFICE VISIT (OUTPATIENT)
Dept: ENDOCRINOLOGY | Facility: CLINIC | Age: 66
End: 2021-11-08

## 2021-11-08 ENCOUNTER — LAB (OUTPATIENT)
Dept: LAB | Facility: HOSPITAL | Age: 66
End: 2021-11-08

## 2021-11-08 VITALS
SYSTOLIC BLOOD PRESSURE: 125 MMHG | BODY MASS INDEX: 33.82 KG/M2 | DIASTOLIC BLOOD PRESSURE: 78 MMHG | OXYGEN SATURATION: 98 % | WEIGHT: 203 LBS | HEIGHT: 65 IN | HEART RATE: 81 BPM

## 2021-11-08 DIAGNOSIS — Z79.4 TYPE 2 DIABETES MELLITUS WITHOUT COMPLICATION, WITH LONG-TERM CURRENT USE OF INSULIN (HCC): Primary | ICD-10-CM

## 2021-11-08 DIAGNOSIS — Z79.4 TYPE 2 DIABETES MELLITUS WITHOUT COMPLICATION, WITH LONG-TERM CURRENT USE OF INSULIN (HCC): ICD-10-CM

## 2021-11-08 DIAGNOSIS — E78.2 MIXED HYPERLIPIDEMIA: ICD-10-CM

## 2021-11-08 DIAGNOSIS — E11.9 TYPE 2 DIABETES MELLITUS WITHOUT COMPLICATION, WITH LONG-TERM CURRENT USE OF INSULIN (HCC): ICD-10-CM

## 2021-11-08 DIAGNOSIS — E11.9 TYPE 2 DIABETES MELLITUS WITHOUT COMPLICATION, WITH LONG-TERM CURRENT USE OF INSULIN (HCC): Primary | ICD-10-CM

## 2021-11-08 DIAGNOSIS — E03.9 ACQUIRED HYPOTHYROIDISM: ICD-10-CM

## 2021-11-08 LAB
ALBUMIN SERPL-MCNC: 4.5 G/DL (ref 3.5–5.2)
ALBUMIN UR-MCNC: <1.2 MG/DL
ALBUMIN/GLOB SERPL: 1.8 G/DL
ALP SERPL-CCNC: 54 U/L (ref 39–117)
ALT SERPL W P-5'-P-CCNC: 45 U/L (ref 1–33)
ANION GAP SERPL CALCULATED.3IONS-SCNC: 10.5 MMOL/L (ref 5–15)
AST SERPL-CCNC: 30 U/L (ref 1–32)
BILIRUB SERPL-MCNC: 0.2 MG/DL (ref 0–1.2)
BUN SERPL-MCNC: 16 MG/DL (ref 8–23)
BUN/CREAT SERPL: 21.3 (ref 7–25)
CALCIUM SPEC-SCNC: 10 MG/DL (ref 8.6–10.5)
CHLORIDE SERPL-SCNC: 102 MMOL/L (ref 98–107)
CHOLEST SERPL-MCNC: 170 MG/DL (ref 0–200)
CO2 SERPL-SCNC: 25.5 MMOL/L (ref 22–29)
CREAT SERPL-MCNC: 0.75 MG/DL (ref 0.57–1)
CREAT UR-MCNC: 28.4 MG/DL
EXPIRATION DATE: NORMAL
EXPIRATION DATE: NORMAL
GFR SERPL CREATININE-BSD FRML MDRD: 77 ML/MIN/1.73
GLOBULIN UR ELPH-MCNC: 2.5 GM/DL
GLUCOSE BLDC GLUCOMTR-MCNC: 113 MG/DL (ref 70–130)
GLUCOSE SERPL-MCNC: 105 MG/DL (ref 65–99)
HBA1C MFR BLD: 7.3 %
HDLC SERPL-MCNC: 58 MG/DL (ref 40–60)
LDLC SERPL CALC-MCNC: 91 MG/DL (ref 0–100)
LDLC/HDLC SERPL: 1.53 {RATIO}
Lab: NORMAL
Lab: NORMAL
MICROALBUMIN/CREAT UR: NORMAL MG/G{CREAT}
POTASSIUM SERPL-SCNC: 4.8 MMOL/L (ref 3.5–5.2)
PROT SERPL-MCNC: 7 G/DL (ref 6–8.5)
SODIUM SERPL-SCNC: 138 MMOL/L (ref 136–145)
TRIGL SERPL-MCNC: 116 MG/DL (ref 0–150)
TSH SERPL DL<=0.05 MIU/L-ACNC: 0.07 UIU/ML (ref 0.27–4.2)
VLDLC SERPL-MCNC: 21 MG/DL (ref 5–40)

## 2021-11-08 PROCEDURE — 84443 ASSAY THYROID STIM HORMONE: CPT

## 2021-11-08 PROCEDURE — 82947 ASSAY GLUCOSE BLOOD QUANT: CPT | Performed by: INTERNAL MEDICINE

## 2021-11-08 PROCEDURE — 80053 COMPREHEN METABOLIC PANEL: CPT

## 2021-11-08 PROCEDURE — 99214 OFFICE O/P EST MOD 30 MIN: CPT | Performed by: INTERNAL MEDICINE

## 2021-11-08 PROCEDURE — 82570 ASSAY OF URINE CREATININE: CPT

## 2021-11-08 PROCEDURE — 82043 UR ALBUMIN QUANTITATIVE: CPT

## 2021-11-08 PROCEDURE — 83036 HEMOGLOBIN GLYCOSYLATED A1C: CPT | Performed by: INTERNAL MEDICINE

## 2021-11-08 PROCEDURE — 80061 LIPID PANEL: CPT

## 2021-11-08 RX ORDER — FLURBIPROFEN SODIUM 0.3 MG/ML
SOLUTION/ DROPS OPHTHALMIC
Qty: 100 EACH | Refills: 3 | Status: SHIPPED | OUTPATIENT
Start: 2021-11-08 | End: 2022-07-28

## 2021-11-08 NOTE — PROGRESS NOTES
"     Office Note      Date: 2021  Patient Name: Paige Gibbs  MRN: [id number]  : 1955    Chief Complaint   Patient presents with   • Diabetes     type 2       History of Present Illness:   Paige Gibbs is a 66 y.o. female who presents for Diabetes - type 2 on insulin and metformin   bg checks are done daily     Last A1c:  Hemoglobin A1C   Date Value Ref Range Status   2021 7.3 % Final       Changes in health since last visit: basal cell cancer taken off of nose . Last eye exam due .    Subjective          Review of Systems:   Review of Systems   Constitutional: Negative.    HENT: Negative.    Eyes: Negative.    Respiratory: Negative.        The following portions of the patient's history were reviewed and updated as appropriate: allergies, current medications, past family history, past medical history, past social history, past surgical history and problem list.    Objective     Visit Vitals  /78   Pulse 81   Ht 165.1 cm (65\")   Wt 92.1 kg (203 lb)   SpO2 98%   BMI 33.78 kg/m²       Labs:    CMP  No results found for: GLUCOSE, BUN, CREATININE, EGFRIFNONA, EGFRIFAFRI, BCR, K, CO2, CALCIUM, PROTENTOTREF, LABIL2, BILIRUBIN, AST, ALT     CBC w/DIFF  No results found for: WBC, RBC, HGB, HCT, MCV, MCH, MCHC, RDW, RDWSD, MPV, PLT, NEUTRORELPCT, LYMPHORELPCT, MONORELPCT, EOSRELPCT, BASORELPCT, AUTOIGPER, NEUTROABS, LYMPHSABS, MONOSABS, EOSABS, BASOSABS, AUTOIGNUM, NRBC    Physical Exam:  Physical Exam  Vitals reviewed.   Constitutional:       Appearance: Normal appearance.   Neck:      Comments: No thyroid abnormality noted  Lymphadenopathy:      Cervical: No cervical adenopathy.   Neurological:      Mental Status: She is alert.   Psychiatric:         Mood and Affect: Mood normal.         Thought Content: Thought content normal.         Judgment: Judgment normal.          Assessment / Plan      Assessment & Plan:  Problem List Items Addressed This Visit        Other    Type 2 diabetes " mellitus without complication, with long-term current use of insulin (HCC) - Primary    Current Assessment & Plan     Diabetes is worsening.   due to recent surgery. no need to change treatmen t  Diabetes will be reassessed in 6 months.         Relevant Medications    metFORMIN (GLUCOPHAGE) 1000 MG tablet    insulin detemir (Levemir FlexTouch) 100 UNIT/ML injection    Other Relevant Orders    POC Glucose, Blood (Completed)    POC Glycosylated Hemoglobin (Hb A1C) (Completed)    Comprehensive Metabolic Panel    Microalbumin / Creatinine Urine Ratio - Urine, Clean Catch    Hyperlipemia    Current Assessment & Plan      On statin. Due for labs          Relevant Medications    pravastatin (PRAVACHOL) 40 MG tablet    Other Relevant Orders    Lipid Panel    Acquired hypothyroidism    Current Assessment & Plan     Clinically euthyroid. Thyroid levels ordered. Medication to be adjusted accordingly.         Relevant Medications    levothyroxine (SYNTHROID, LEVOTHROID) 125 MCG tablet    carvedilol (COREG) 3.125 MG tablet    Other Relevant Orders    TSH           Urban Ram MD   11/08/2021

## 2021-11-08 NOTE — ASSESSMENT & PLAN NOTE
Diabetes is worsening.   due to recent surgery. no need to change treatmen t  Diabetes will be reassessed in 6 months.

## 2021-11-09 RX ORDER — LEVOTHYROXINE SODIUM 112 UG/1
112 TABLET ORAL DAILY
Qty: 90 TABLET | Refills: 3 | Status: SHIPPED | OUTPATIENT
Start: 2021-11-09

## 2021-11-17 ENCOUNTER — OFFICE VISIT (OUTPATIENT)
Dept: CARDIOLOGY | Facility: CLINIC | Age: 66
End: 2021-11-17

## 2021-11-17 VITALS
HEART RATE: 68 BPM | DIASTOLIC BLOOD PRESSURE: 76 MMHG | SYSTOLIC BLOOD PRESSURE: 138 MMHG | BODY MASS INDEX: 34.16 KG/M2 | WEIGHT: 205 LBS | HEIGHT: 65 IN | TEMPERATURE: 97.8 F

## 2021-11-17 DIAGNOSIS — I25.10 CORONARY ARTERY DISEASE INVOLVING NATIVE CORONARY ARTERY OF NATIVE HEART WITHOUT ANGINA PECTORIS: Primary | ICD-10-CM

## 2021-11-17 DIAGNOSIS — Z95.5 HISTORY OF PLACEMENT OF STENT IN LAD CORONARY ARTERY: ICD-10-CM

## 2021-11-17 DIAGNOSIS — E78.2 MIXED HYPERLIPIDEMIA: ICD-10-CM

## 2021-11-17 DIAGNOSIS — E11.9 TYPE 2 DIABETES MELLITUS WITHOUT COMPLICATION, WITH LONG-TERM CURRENT USE OF INSULIN (HCC): ICD-10-CM

## 2021-11-17 DIAGNOSIS — I10 ESSENTIAL HYPERTENSION: ICD-10-CM

## 2021-11-17 DIAGNOSIS — I10 PRIMARY HYPERTENSION: ICD-10-CM

## 2021-11-17 DIAGNOSIS — Z79.4 TYPE 2 DIABETES MELLITUS WITHOUT COMPLICATION, WITH LONG-TERM CURRENT USE OF INSULIN (HCC): ICD-10-CM

## 2021-11-17 PROCEDURE — 99213 OFFICE O/P EST LOW 20 MIN: CPT | Performed by: NURSE PRACTITIONER

## 2021-11-17 RX ORDER — CARVEDILOL 3.12 MG/1
3.12 TABLET ORAL DAILY
Qty: 90 TABLET | Refills: 3 | Status: SHIPPED | OUTPATIENT
Start: 2021-11-17 | End: 2022-06-16 | Stop reason: SDUPTHER

## 2021-11-17 RX ORDER — TRIAMTERENE AND HYDROCHLOROTHIAZIDE 37.5; 25 MG/1; MG/1
1 CAPSULE ORAL EVERY MORNING
Qty: 90 CAPSULE | Refills: 3 | Status: SHIPPED | OUTPATIENT
Start: 2021-11-17 | End: 2022-06-16 | Stop reason: SDUPTHER

## 2021-11-17 RX ORDER — PRAVASTATIN SODIUM 40 MG
40 TABLET ORAL DAILY
Qty: 90 TABLET | Refills: 3 | Status: SHIPPED | OUTPATIENT
Start: 2021-11-17 | End: 2022-06-16 | Stop reason: SDUPTHER

## 2021-11-17 NOTE — PROGRESS NOTES
Chief Complaint   Patient presents with   • Follow-up     For cardiac management. Patient is on aspirin. Last lab work was done on 11/08/21 per Dr. Ram, in chart under labs. States that she occasionally has palpitations, but states that is nothing new for her.    • Med Refill     Needs refills on cardiac medications. 90 day supplies to [facility]. Brought medication list with visit.      Subjective       Paige Gibbs is a 66 y.o. female with HTN, hyperlipidemia, DM, and IHD diagnosed in 2011 when she underwent stenting of the LAD and then in 2012 underwent stenting of the ramus. Cardiac work up in 2014 and again in 2018 showed no ischemia and normal LV function. She follows with Dr. Ram in [place] for diabetic management.  Effient was stopped in 2019 secondary to anemia which has improved. Labs 12/2019 H/H 13.3/40.8, A1C 6%, HDL 65, LDL 45, Tri 141.     She returns today for follow up. She feels well. Denies chest pain, SOB, continues to have occasional palpitations. She is planning to retire this year. Labs per endo on 11/8/21: , Tri 116, HDL 58, LDL 91,  A1C 7.3%, increased from 6.4%.          Cardiac History:    Past Surgical History:   Procedure Laterality Date   • CARDIAC CATHETERIZATION  08/12/2011    75% LAD- 2.75x18 Promus.   • CARDIAC CATHETERIZATION  05/25/2012    90% Ramus- 2.25x18 Promus   • CARDIOVASCULAR STRESS TEST  07/18/2011    3 min, 30 sec. 93% THR. bp-164/72. R/O anteroseptal ischemia   • CARDIOVASCULAR STRESS TEST  05/14/2012    4 min, 91% THR, 100/54, septal ischemi   • CARDIOVASCULAR STRESS TEST  12/14/2012    4 min, 90% THR>BP-150/70. Negative   • CARDIOVASCULAR STRESS TEST  08/26/2014    6 min, 94% THR, 132/64, negative for ischemia   • CARDIOVASCULAR STRESS TEST  01/10/2018    L.Cardiolite- EF > 65%. R/O Breast attenuation   • CARDIOVASCULAR STRESS TEST  01/10/2018    Lexiscan- no definite ischemia noted   • CONVERTED (HISTORICAL) HOLTER  12/05/2012    AVG HR 76 BPM.  Rare PVC's   • ECHO - CONVERTED  08/02/2011    >60%   • ECHO - CONVERTED  05/14/2012    EF 65-70%   • ECHO - CONVERTED  08/26/2014    EF 65%, RVSP 26 mmHg   • ECHO - CONVERTED  01/10/2018    EF 65%. RVSP- 30 mmHg   • ECHO - CONVERTED  01/10/2018    EF 60-65%, no AS, mild MR, RVSP 30 mmHg   • ECHO - CONVERTED  01/10/2018    EF 60-65%, no AS, mild MR, RVSP 30 mmHg     Current Outpatient Medications   Medication Sig Dispense Refill   • aspirin 81 MG EC tablet Take 81 mg by mouth daily.     • carvedilol (COREG) 3.125 MG tablet Take 1 tablet by mouth Daily. 90 tablet 3   • glucosamine-chondroitin 500-400 MG capsule capsule Take  by mouth Daily.     • insulin detemir (Levemir FlexTouch) 100 UNIT/ML injection Inject 40 Units under the skin into the appropriate area as directed Every Night. 45 mL 3   • Insulin Pen Needle (B-D UF III MINI PEN NEEDLES) 31G X 5 MM misc Use to give insulin daily 100 each 3   • levothyroxine (Synthroid) 112 MCG tablet Take 1 tablet by mouth Daily. 90 tablet 3   • Magnesium 250 MG tablet Take  by mouth Daily.     • metFORMIN (GLUCOPHAGE) 1000 MG tablet Take 1,000 mg by mouth 2 (two) times a day with meals.     • omeprazole (PriLOSEC) 20 MG capsule Take 20 mg by mouth daily.     • pravastatin (PRAVACHOL) 40 MG tablet Take 1 tablet by mouth Daily. 90 tablet 3   • traZODone (DESYREL) 100 MG tablet Take 100 mg by mouth every night.     • triamterene-hydrochlorothiazide (DYAZIDE) 37.5-25 MG per capsule Take 1 capsule by mouth Every Morning. 90 capsule 3   • vitamin B-12 (CYANOCOBALAMIN) 1000 MCG tablet Take 1,000 mcg by mouth Daily.     • Vitamin D, Cholecalciferol, 1000 UNITS tablet Take 1,000 Units by mouth Daily.       No current facility-administered medications for this visit.     Codeine, Farxiga [dapagliflozin], Invokana [canagliflozin], and Tresiba flextouch [insulin degludec]    Past Medical History:   Diagnosis Date   • Anemia 05/2019   • Diabetes mellitus (HCC)    • GERD (gastroesophageal  "reflux disease)    • H/O foot surgery     left and right foot   • History of cholecystectomy    • History of tonsillectomy    • HTN (hypertension)    • Hyperlipidemia    • Hypothyroidism    • Pancreatitis    • Type 2 diabetes mellitus (HCC)    • Vitamin D deficiency      Social History     Socioeconomic History   • Marital status:    Tobacco Use   • Smoking status: Former Smoker     Packs/day: 0.00     Years: 0.00     Pack years: 0.00     Quit date:      Years since quittin.9   • Smokeless tobacco: Never Used   Vaping Use   • Vaping Use: Never used   Substance and Sexual Activity   • Alcohol use: No   • Drug use: No   • Sexual activity: Defer     Family History   Problem Relation Age of Onset   • Heart attack Mother         first MI at age 28   • Heart disease Father         CABG at age 56. 1-2 stents after that       Review of Systems   Constitutional: Negative for decreased appetite and malaise/fatigue.   HENT: Negative.    Eyes: Negative for blurred vision.   Cardiovascular: Positive for palpitations. Negative for chest pain, dyspnea on exertion, leg swelling and syncope.   Respiratory: Negative for shortness of breath and sleep disturbances due to breathing.    Endocrine: Negative.    Hematologic/Lymphatic: Negative for bleeding problem. Does not bruise/bleed easily.   Skin: Negative.    Musculoskeletal: Negative for falls and myalgias.   Gastrointestinal: Negative for abdominal pain, heartburn and melena.   Genitourinary: Negative for hematuria.   Neurological: Negative for dizziness and light-headedness.   Psychiatric/Behavioral: Negative for altered mental status.   Allergic/Immunologic: Negative.         Objective     /76 (BP Location: Left arm)   Pulse 68   Temp 97.8 °F (36.6 °C)   Ht 165.1 cm (65\")   Wt 93 kg (205 lb)   BMI 34.11 kg/m²     Vitals and nursing note reviewed.   Constitutional:       General: Not in acute distress.     Appearance: Well-developed. Not diaphoretic. "   Eyes:      Pupils: Pupils are equal, round, and reactive to light.   HENT:      Head: Normocephalic.   Pulmonary:      Effort: Pulmonary effort is normal. No respiratory distress.      Breath sounds: Normal breath sounds.   Cardiovascular:      Normal rate. Regular rhythm.   Pulses:     Intact distal pulses.   Abdominal:      General: Bowel sounds are normal.      Palpations: Abdomen is soft.   Musculoskeletal: Normal range of motion.      Cervical back: Normal range of motion. Skin:     General: Skin is warm and dry.   Neurological:      Mental Status: Alert and oriented to person, place, and time.        Procedures          Problem List Items Addressed This Visit        Cardiac and Vasculature    CAD (coronary artery disease) - Primary    Relevant Medications    carvedilol (COREG) 3.125 MG tablet    HTN (hypertension)    Relevant Medications    carvedilol (COREG) 3.125 MG tablet    triamterene-hydrochlorothiazide (DYAZIDE) 37.5-25 MG per capsule    Hyperlipemia    Relevant Medications    pravastatin (PRAVACHOL) 40 MG tablet    History of placement of stent in LAD coronary artery       Endocrine and Metabolic    Type 2 diabetes mellitus without complication, with long-term current use of insulin ([facility])      Other Visit Diagnoses     Essential hypertension        Relevant Medications    carvedilol (COREG) 3.125 MG tablet    triamterene-hydrochlorothiazide (DYAZIDE) 37.5-25 MG per capsule        1.  CAD-s/p stenting LAD, 2011, s/p stenting ramus, 2012. Negative stress, 2014, 2018. She denies anginal symptoms. Continue aspirin, beta blocker, statin.  Regular walking encouraged.     2. HTN- well controlled, continue triamterene/HCTZ, Coreg. Limit sodium 1500 mg daily.      3. Hyperlipidemia- lipids checked 11/2021 stable with LDL 91. Continue pravastatin. Heart healthy diet.       4. Diabetes- A1C increased to 7.3%.  Continue to watch diet closely, limiting sugars, carbohydrates. She plans to be more active once  retiring.  Continue aspirin, statin, she is not on ACE or ARB. If BP increases, will add lisinopril for bp and renovascular protection.      Will plan to repeat stress test next year once she is on Medicare and out of pocket cost will be less. If she develops symptoms in the interim, advised to call. No changes made today. Refills sent.     Patient's Body mass index is 34.11 kg/m². indicating that she is obese (BMI >30).            Electronically signed by JULIAN Isaac,  November 19, 2021 12:18 EST

## 2022-05-13 RX ORDER — INSULIN DETEMIR 100 [IU]/ML
40 INJECTION, SOLUTION SUBCUTANEOUS NIGHTLY
Qty: 45 ML | Refills: 0 | Status: SHIPPED | OUTPATIENT
Start: 2022-05-13 | End: 2022-07-28

## 2022-06-16 ENCOUNTER — OFFICE VISIT (OUTPATIENT)
Dept: CARDIOLOGY | Facility: CLINIC | Age: 67
End: 2022-06-16

## 2022-06-16 VITALS
BODY MASS INDEX: 34.55 KG/M2 | HEIGHT: 65 IN | SYSTOLIC BLOOD PRESSURE: 140 MMHG | DIASTOLIC BLOOD PRESSURE: 64 MMHG | WEIGHT: 207.4 LBS | HEART RATE: 64 BPM

## 2022-06-16 DIAGNOSIS — I10 ESSENTIAL HYPERTENSION: ICD-10-CM

## 2022-06-16 DIAGNOSIS — R53.83 OTHER FATIGUE: ICD-10-CM

## 2022-06-16 DIAGNOSIS — I25.118 CORONARY ARTERY DISEASE INVOLVING NATIVE CORONARY ARTERY OF NATIVE HEART WITH OTHER FORM OF ANGINA PECTORIS: Primary | ICD-10-CM

## 2022-06-16 DIAGNOSIS — E11.9 TYPE 2 DIABETES MELLITUS WITHOUT COMPLICATION, WITH LONG-TERM CURRENT USE OF INSULIN: ICD-10-CM

## 2022-06-16 DIAGNOSIS — Z79.4 TYPE 2 DIABETES MELLITUS WITHOUT COMPLICATION, WITH LONG-TERM CURRENT USE OF INSULIN: ICD-10-CM

## 2022-06-16 DIAGNOSIS — I10 PRIMARY HYPERTENSION: ICD-10-CM

## 2022-06-16 DIAGNOSIS — E78.2 MIXED HYPERLIPIDEMIA: ICD-10-CM

## 2022-06-16 PROCEDURE — 99214 OFFICE O/P EST MOD 30 MIN: CPT | Performed by: NURSE PRACTITIONER

## 2022-06-16 RX ORDER — TRIAMTERENE AND HYDROCHLOROTHIAZIDE 37.5; 25 MG/1; MG/1
1 CAPSULE ORAL EVERY MORNING
Qty: 90 CAPSULE | Refills: 3 | Status: SHIPPED | OUTPATIENT
Start: 2022-06-16 | End: 2023-01-04 | Stop reason: SDUPTHER

## 2022-06-16 RX ORDER — CARVEDILOL 3.12 MG/1
3.12 TABLET ORAL DAILY
Qty: 90 TABLET | Refills: 3 | Status: SHIPPED | OUTPATIENT
Start: 2022-06-16 | End: 2023-01-04 | Stop reason: SDUPTHER

## 2022-06-16 RX ORDER — PRAVASTATIN SODIUM 40 MG
40 TABLET ORAL DAILY
Qty: 90 TABLET | Refills: 3 | Status: SHIPPED | OUTPATIENT
Start: 2022-06-16 | End: 2023-01-04 | Stop reason: SDUPTHER

## 2022-06-16 NOTE — PROGRESS NOTES
Chief Complaint   Patient presents with   • Follow-up     Cardiac management .Has no cardiac  complaints today.   • LABS     Has copy of current labs  April 2022   • Med Refill     Needs refills on Coreg , Dyazide, and pravastatin 90 day supply to [facility] delivery pharmacy       Subjective       Paige Gibbs is a 67 y.o. female with HTN, hyperlipidemia, DM, and IHD diagnosed in 2011 when she underwent stenting of the LAD and then in 2012 underwent stenting of the ramus. Cardiac work up in 2014 and again in 2018 showed no ischemia and normal LV function. She follows with Dr. Ram in [place] for diabetic management.  Effient was stopped in 2019 secondary to anemia which has improved. Labs 12/2019 H/H 13.3/40.8, A1C 6%, HDL 65, LDL 45, Tri 141.     Today she comes to the office for a follow-up visit.  She admits to slight increase in blood pressure over the last several months.  At times she has noticed a recurrence of prickling type chest pain as well as mild fatigue.  These are the symptoms she is experienced prior to stent placement in the past.  Recent labs showed A1c of 7.5.    Cardiac History:    Past Surgical History:   Procedure Laterality Date   • CARDIAC CATHETERIZATION  08/12/2011    75% LAD- 2.75x18 Promus.   • CARDIAC CATHETERIZATION  05/25/2012    90% Ramus- 2.25x18 Promus   • CARDIOVASCULAR STRESS TEST  07/18/2011    3 min, 30 sec. 93% THR. bp-164/72. R/O anteroseptal ischemia   • CARDIOVASCULAR STRESS TEST  05/14/2012    4 min, 91% THR, 100/54, septal ischemi   • CARDIOVASCULAR STRESS TEST  12/14/2012    4 min, 90% THR>BP-150/70. Negative   • CARDIOVASCULAR STRESS TEST  08/26/2014    6 min, 94% THR, 132/64, negative for ischemia   • CARDIOVASCULAR STRESS TEST  01/10/2018    L.Cardiolite- EF > 65%. R/O Breast attenuation   • CARDIOVASCULAR STRESS TEST  01/10/2018    Lexiscan- no definite ischemia noted   • CONVERTED (HISTORICAL) HOLTER  12/05/2012    AVG HR 76 BPM. Rare PVC's   • ECHO -  CONVERTED  08/02/2011    >60%   • ECHO - CONVERTED  05/14/2012    EF 65-70%   • ECHO - CONVERTED  08/26/2014    EF 65%, RVSP 26 mmHg   • ECHO - CONVERTED  01/10/2018    EF 65%. RVSP- 30 mmHg   • ECHO - CONVERTED  01/10/2018    EF 60-65%, no AS, mild MR, RVSP 30 mmHg   • ECHO - CONVERTED  01/10/2018    EF 60-65%, no AS, mild MR, RVSP 30 mmHg       Current Outpatient Medications   Medication Sig Dispense Refill   • aspirin 81 MG EC tablet Take 81 mg by mouth daily.     • B Complex-Biotin-FA (B-COMPLEX PO) Take  by mouth.     • carvedilol (COREG) 3.125 MG tablet Take 1 tablet by mouth Daily. 90 tablet 3   • insulin detemir (Levemir FlexTouch) 100 UNIT/ML injection Inject 40 Units under the skin into the appropriate area as directed Every Night. NEEDS AN APPT FOR REFILLS 45 mL 0   • Insulin Pen Needle (B-D UF III MINI PEN NEEDLES) 31G X 5 MM misc Use to give insulin daily 100 each 3   • levothyroxine (Synthroid) 112 MCG tablet Take 1 tablet by mouth Daily. 90 tablet 3   • Magnesium 400 MG tablet Take 1 tablet by mouth Daily.     • metFORMIN (GLUCOPHAGE) 1000 MG tablet Take 1,000 mg by mouth 2 (two) times a day with meals.     • omeprazole (PriLOSEC) 20 MG capsule Take 20 mg by mouth daily.     • pravastatin (PRAVACHOL) 40 MG tablet Take 1 tablet by mouth Daily. 90 tablet 3   • traZODone (DESYREL) 100 MG tablet Take 100 mg by mouth every night.     • triamterene-hydrochlorothiazide (DYAZIDE) 37.5-25 MG per capsule Take 1 capsule by mouth Every Morning. 90 capsule 3   • Vitamin D, Cholecalciferol, 1000 UNITS tablet Take 1,000 Units by mouth Daily.       No current facility-administered medications for this visit.       Codeine, Farxiga [dapagliflozin], Invokana [canagliflozin], and Tresiba flextouch [insulin degludec]    Past Medical History:   Diagnosis Date   • Anemia 05/2019   • Diabetes mellitus (HCC)    • GERD (gastroesophageal reflux disease)    • H/O foot surgery     left and right foot   • History of  cholecystectomy    • History of tonsillectomy    • HTN (hypertension)    • Hyperlipidemia    • Hypothyroidism    • Pancreatitis    • Type 2 diabetes mellitus (HCC)    • Vitamin D deficiency        Social History     Socioeconomic History   • Marital status:    Tobacco Use   • Smoking status: Former Smoker     Packs/day: 0.00     Years: 0.00     Pack years: 0.00     Quit date: 1986     Years since quittin.4   • Smokeless tobacco: Never Used   Vaping Use   • Vaping Use: Never used   Substance and Sexual Activity   • Alcohol use: No   • Drug use: No   • Sexual activity: Defer       Family History   Problem Relation Age of Onset   • Heart attack Mother    • Heart disease Father        Review of Systems   Constitutional: Negative for decreased appetite, diaphoresis and malaise/fatigue.   HENT: Negative for nosebleeds.    Eyes: Negative for blurred vision.   Cardiovascular: Positive for chest pain. Negative for claudication, cyanosis, dyspnea on exertion, irregular heartbeat, leg swelling, near-syncope, orthopnea, palpitations, paroxysmal nocturnal dyspnea and syncope.   Respiratory: Negative for shortness of breath.    Endocrine: Negative for cold intolerance and heat intolerance.   Hematologic/Lymphatic: Negative for bleeding problem. Does not bruise/bleed easily.   Skin: Negative for rash.   Musculoskeletal: Negative for myalgias.   Gastrointestinal: Negative for heartburn, melena and nausea.   Genitourinary: Negative for dysuria and hematuria.   Neurological: Negative for dizziness and light-headedness.   Psychiatric/Behavioral: The patient does not have insomnia and is not nervous/anxious.         BP Readings from Last 5 Encounters:   22 140/64   21 138/76   21 125/78   21 142/74   21 128/70       Wt Readings from Last 5 Encounters:   22 94.1 kg (207 lb 6.4 oz)   21 93 kg (205 lb)   21 92.1 kg (203 lb)   21 91.6 kg (202 lb)   21 91.3 kg  "(201 lb 3.2 oz)       Objective      11/8/21: , Tri 116, HDL 58, LDL 91,  A1C 7.3%, increased from 6.4%.     /64 (BP Location: Left arm)   Pulse 64   Ht 165.1 cm (65\")   Wt 94.1 kg (207 lb 6.4 oz)   BMI 34.51 kg/m²     Eyes:      Pupils: Pupils are equal, round, and reactive to light.   HENT:      Head: Normocephalic.   Neck:      Vascular: No carotid bruit or JVD.   Pulmonary:      Breath sounds: Normal breath sounds.   Cardiovascular:      Normal rate. Regular rhythm.      Murmurs: There is no murmur.   Edema:     Peripheral edema absent.   Abdominal:      General: Bowel sounds are normal.      Palpations: Abdomen is soft.   Musculoskeletal: Normal range of motion.      Cervical back: Normal range of motion. Skin:     General: Skin is warm.   Neurological:      Mental Status: Alert and oriented to person, place, and time.          Procedures: none today          Assessment & Plan   Diagnoses and all orders for this visit:    1. Coronary artery disease involving native coronary artery of native heart with other form of angina pectoris (HCC) (Primary)  -     Stress Test With Myocardial Perfusion One Day; Future  -     Adult Transthoracic Echo Complete W/ Cont if Necessary Per Protocol; Future    2. Primary hypertension    3. Mixed hyperlipidemia    4. Type 2 diabetes mellitus without complication, with long-term current use of insulin (HCC)  -     Stress Test With Myocardial Perfusion One Day; Future  -     Adult Transthoracic Echo Complete W/ Cont if Necessary Per Protocol; Future    5. Essential hypertension  -     carvedilol (COREG) 3.125 MG tablet; Take 1 tablet by mouth Daily.  Dispense: 90 tablet; Refill: 3    6. Other fatigue  -     Stress Test With Myocardial Perfusion One Day; Future  -     Adult Transthoracic Echo Complete W/ Cont if Necessary Per Protocol; Future    Other orders  -     triamterene-hydrochlorothiazide (DYAZIDE) 37.5-25 MG per capsule; Take 1 capsule by mouth Every Morning.  " Dispense: 90 capsule; Refill: 3  -     pravastatin (PRAVACHOL) 40 MG tablet; Take 1 tablet by mouth Daily.  Dispense: 90 tablet; Refill: 3       CAD  - History stent of LAD 2011 and stenting of ramus 2012.  Stress test 2018 was negative for ischemia.  -She admits to symptoms suggestive of ischemia, which she experienced prior to stent placement.  Due to her cardiac history, risk, and length of time recommend repeat cardiac work-up.  Nuclear stress test and echocardiogram ordered.  -Continue aspirin, beta-blocker, and statin    Hypertension  - Systolic .  Continue to monitor blood pressure.  Refills given for antihypertensive medication management.  At this time no changes made.  -Dash diet encouraged.  Routine exercise as well as BMI of less than 30 encouraged    Hyperlipidemia  - Recent labs shows HDL and LDL at goal.  Triglycerides elevated.  Good diabetic management encouraged.  -Continue pravastatin.  Refills faxed to her pharmacy    Diabetes  - A1c 7.5.  Follows with endocrinologist.    Further recommendations based on test results.  A 6-month follow-up visit scheduled.  Please call sooner for cardiac concerns.

## 2022-07-27 ENCOUNTER — HOSPITAL ENCOUNTER (OUTPATIENT)
Dept: CARDIOLOGY | Facility: HOSPITAL | Age: 67
Discharge: HOME | End: 2022-07-27

## 2022-07-27 VITALS — WEIGHT: 207.45 LBS | BODY MASS INDEX: 34.56 KG/M2 | HEIGHT: 65 IN

## 2022-07-27 DIAGNOSIS — E11.9 TYPE 2 DIABETES MELLITUS WITHOUT COMPLICATION, WITH LONG-TERM CURRENT USE OF INSULIN: ICD-10-CM

## 2022-07-27 DIAGNOSIS — I25.118 CORONARY ARTERY DISEASE INVOLVING NATIVE CORONARY ARTERY OF NATIVE HEART WITH OTHER FORM OF ANGINA PECTORIS: ICD-10-CM

## 2022-07-27 DIAGNOSIS — Z79.4 TYPE 2 DIABETES MELLITUS WITHOUT COMPLICATION, WITH LONG-TERM CURRENT USE OF INSULIN: ICD-10-CM

## 2022-07-27 DIAGNOSIS — R53.83 OTHER FATIGUE: ICD-10-CM

## 2022-07-27 LAB
AORTIC DIMENSIONLESS INDEX: 1 (DI)
BH CV ECHO MEAS - AO MAX PG: 5.8 MMHG
BH CV ECHO MEAS - AO MEAN PG: 3.1 MMHG
BH CV ECHO MEAS - AO ROOT DIAM: 2.9 CM
BH CV ECHO MEAS - AO V2 MAX: 120.8 CM/SEC
BH CV ECHO MEAS - AO V2 VTI: 26.3 CM
BH CV ECHO MEAS - EDV(CUBED): 61.5 ML
BH CV ECHO MEAS - ESV(CUBED): 23.1 ML
BH CV ECHO MEAS - FS: 27.8 %
BH CV ECHO MEAS - IVS/LVPW: 0.94 CM
BH CV ECHO MEAS - IVSD: 1.06 CM
BH CV ECHO MEAS - LA DIMENSION: 4 CM
BH CV ECHO MEAS - LAT PEAK E' VEL: 7.7 CM/SEC
BH CV ECHO MEAS - LV MASS(C)D: 140.9 GRAMS
BH CV ECHO MEAS - LV MAX PG: 5.1 MMHG
BH CV ECHO MEAS - LV MEAN PG: 2.29 MMHG
BH CV ECHO MEAS - LV V1 MAX: 113.4 CM/SEC
BH CV ECHO MEAS - LV V1 VTI: 25.3 CM
BH CV ECHO MEAS - LVIDD: 3.9 CM
BH CV ECHO MEAS - LVIDS: 2.8 CM
BH CV ECHO MEAS - LVPWD: 1.12 CM
BH CV ECHO MEAS - MED PEAK E' VEL: 7.3 CM/SEC
BH CV ECHO MEAS - MV A MAX VEL: 113.7 CM/SEC
BH CV ECHO MEAS - MV DEC SLOPE: 438.2 CM/SEC2
BH CV ECHO MEAS - MV DEC TIME: 0.27 MSEC
BH CV ECHO MEAS - MV E MAX VEL: 71.7 CM/SEC
BH CV ECHO MEAS - MV E/A: 0.63
BH CV ECHO MEAS - MV MAX PG: 6.7 MMHG
BH CV ECHO MEAS - MV MEAN PG: 2.2 MMHG
BH CV ECHO MEAS - MV P1/2T: 60.7 MSEC
BH CV ECHO MEAS - MV V2 VTI: 34.8 CM
BH CV ECHO MEAS - MVA(P1/2T): 3.6 CM2
BH CV ECHO MEAS - PA V2 MAX: 91.8 CM/SEC
BH CV ECHO MEAS - RAP SYSTOLE: 10 MMHG
BH CV ECHO MEAS - RV MAX PG: 2.04 MMHG
BH CV ECHO MEAS - RV V1 MAX: 71.4 CM/SEC
BH CV ECHO MEAS - RV V1 VTI: 13.4 CM
BH CV ECHO MEAS - RVSP: 27.7 MMHG
BH CV ECHO MEAS - TAPSE (>1.6): 1.75 CM
BH CV ECHO MEAS - TR MAX PG: 17.7 MMHG
BH CV ECHO MEAS - TR MAX VEL: 210.6 CM/SEC
BH CV ECHO MEASUREMENTS AVERAGE E/E' RATIO: 9.56
BH CV REST NUCLEAR ISOTOPE DOSE: 10 MCI
BH CV STRESS COMMENTS STAGE 1: NORMAL
BH CV STRESS DOSE REGADENOSON STAGE 1: 0.4
BH CV STRESS DURATION MIN STAGE 1: 0
BH CV STRESS DURATION SEC STAGE 1: 10
BH CV STRESS NUCLEAR ISOTOPE DOSE: 30 MCI
BH CV STRESS PROTOCOL 1: NORMAL
BH CV STRESS RECOVERY BP: NORMAL MMHG
BH CV STRESS RECOVERY HR: 87 BPM
BH CV STRESS STAGE 1: 1
BH CV XLRA - TDI S': 12.1 CM/SEC
IVRT: 114 MSEC
LV EF NUC BP: 74 %
MAXIMAL PREDICTED HEART RATE: 153 BPM
MAXIMAL PREDICTED HEART RATE: 153 BPM
PERCENT MAX PREDICTED HR: 71.24 %
STRESS BASELINE BP: NORMAL MMHG
STRESS BASELINE HR: 71 BPM
STRESS PERCENT HR: 84 %
STRESS POST PEAK BP: NORMAL MMHG
STRESS POST PEAK HR: 109 BPM
STRESS TARGET HR: 130 BPM
STRESS TARGET HR: 130 BPM

## 2022-07-27 PROCEDURE — 78452 HT MUSCLE IMAGE SPECT MULT: CPT | Performed by: INTERNAL MEDICINE

## 2022-07-27 PROCEDURE — 93017 CV STRESS TEST TRACING ONLY: CPT

## 2022-07-27 PROCEDURE — 78452 HT MUSCLE IMAGE SPECT MULT: CPT

## 2022-07-27 PROCEDURE — A9500 TC99M SESTAMIBI: HCPCS | Performed by: INTERNAL MEDICINE

## 2022-07-27 PROCEDURE — 93306 TTE W/DOPPLER COMPLETE: CPT | Performed by: INTERNAL MEDICINE

## 2022-07-27 PROCEDURE — 25010000002 REGADENOSON 0.4 MG/5ML SOLUTION: Performed by: INTERNAL MEDICINE

## 2022-07-27 PROCEDURE — 0 TECHNETIUM SESTAMIBI: Performed by: INTERNAL MEDICINE

## 2022-07-27 PROCEDURE — 93018 CV STRESS TEST I&R ONLY: CPT | Performed by: INTERNAL MEDICINE

## 2022-07-27 PROCEDURE — 93306 TTE W/DOPPLER COMPLETE: CPT

## 2022-07-27 RX ADMIN — REGADENOSON 0.4 MG: 0.08 INJECTION, SOLUTION INTRAVENOUS at 13:54

## 2022-07-27 RX ADMIN — TECHNETIUM TC 99M SESTAMIBI 1 DOSE: 1 INJECTION INTRAVENOUS at 11:36

## 2022-07-27 RX ADMIN — TECHNETIUM TC 99M SESTAMIBI 1 DOSE: 1 INJECTION INTRAVENOUS at 13:54

## 2022-07-28 RX ORDER — AMLODIPINE BESYLATE 5 MG/1
5 TABLET ORAL DAILY
Qty: 30 TABLET | Refills: 11 | Status: SHIPPED | OUTPATIENT
Start: 2022-07-28 | End: 2022-10-18

## 2022-07-29 RX ORDER — AMLODIPINE BESYLATE 5 MG/1
5 TABLET ORAL DAILY
Qty: 90 TABLET | Refills: 3 | Status: SHIPPED | OUTPATIENT
Start: 2022-07-29 | End: 2023-01-04 | Stop reason: SDUPTHER

## 2022-09-09 RX ORDER — INSULIN DETEMIR 100 [IU]/ML
40 INJECTION, SOLUTION SUBCUTANEOUS DAILY
Qty: 12 ML | Refills: 0 | Status: SHIPPED | OUTPATIENT
Start: 2022-09-09 | End: 2022-10-18 | Stop reason: SDUPTHER

## 2022-09-09 NOTE — TELEPHONE ENCOUNTER
----- Message from Paige Gibbs sent at 9/8/2022  4:23 PM EDT -----  Regarding: Levemir refill  Could you refill my Levemir pens? I have an appointment on October 18, 2022 and only have 2 pens left. My insurance is now through [employer].   Thank you,   Paige Gibbs

## 2022-10-18 ENCOUNTER — OFFICE VISIT (OUTPATIENT)
Dept: ENDOCRINOLOGY | Facility: CLINIC | Age: 67
End: 2022-10-18

## 2022-10-18 VITALS
SYSTOLIC BLOOD PRESSURE: 122 MMHG | HEIGHT: 64 IN | BODY MASS INDEX: 32.95 KG/M2 | OXYGEN SATURATION: 97 % | DIASTOLIC BLOOD PRESSURE: 73 MMHG | WEIGHT: 193 LBS | HEART RATE: 74 BPM

## 2022-10-18 DIAGNOSIS — E11.9 TYPE 2 DIABETES MELLITUS WITHOUT COMPLICATION, WITH LONG-TERM CURRENT USE OF INSULIN: Primary | ICD-10-CM

## 2022-10-18 DIAGNOSIS — Z79.4 TYPE 2 DIABETES MELLITUS WITHOUT COMPLICATION, WITH LONG-TERM CURRENT USE OF INSULIN: Primary | ICD-10-CM

## 2022-10-18 LAB
EXPIRATION DATE: NORMAL
GLUCOSE BLDC GLUCOMTR-MCNC: 96 MG/DL (ref 70–130)
HBA1C MFR BLD: 6.8 %
Lab: NORMAL

## 2022-10-18 PROCEDURE — 82947 ASSAY GLUCOSE BLOOD QUANT: CPT | Performed by: INTERNAL MEDICINE

## 2022-10-18 PROCEDURE — 99213 OFFICE O/P EST LOW 20 MIN: CPT | Performed by: INTERNAL MEDICINE

## 2022-10-18 RX ORDER — INSULIN DETEMIR 100 [IU]/ML
40 INJECTION, SOLUTION SUBCUTANEOUS DAILY
Qty: 12 ML | Refills: 0 | Status: SHIPPED | OUTPATIENT
Start: 2022-10-18 | End: 2022-10-19 | Stop reason: SDUPTHER

## 2022-10-18 RX ORDER — PEN NEEDLE, DIABETIC 32GX 5/32"
NEEDLE, DISPOSABLE MISCELLANEOUS
Qty: 100 EACH | Refills: 3 | Status: SHIPPED | OUTPATIENT
Start: 2022-10-18

## 2022-10-18 NOTE — PROGRESS NOTES
"     Office Note      Date: 10/18/2022  Patient Name: Paige Gibbs  MRN: [id number]  : 1955    Chief Complaint   Patient presents with   • Diabetes       History of Present Illness:   Paige Gibbs is a 67 y.o. female who presents for Diabetes - type 2  RX: daily insulin + metformin   bg checks - daily  Seldom too low  Last A1c:  Hemoglobin A1C   Date Value Ref Range Status   10/06/2022 6.8 % Final       Changes in health since last visit: none . Last eye exam up to date.    Subjective          Review of Systems:   Review of Systems   Constitutional: Negative.    HENT: Negative.    Eyes: Negative.    Respiratory: Negative.        The following portions of the patient's history were reviewed and updated as appropriate: allergies, current medications, past family history, past medical history, past social history, past surgical history and problem list.    Objective     Visit Vitals  /73   Pulse 74   Ht 162.6 cm (64\")   Wt 87.5 kg (193 lb)   SpO2 97%   BMI 33.13 kg/m²       Labs:    CMP  Lab Results   Component Value Date    GLUCOSE 105 (H) 2021    BUN 16 2021    CREATININE 0.75 2021    EGFRIFNONA 77 2021    BCR 21.3 2021    K 4.8 2021    CO2 25.5 2021    CALCIUM 10.0 2021    AST 30 2021    ALT 45 (H) 2021        CBC w/DIFF  No results found for: WBC, RBC, HGB, HCT, MCV, MCH, MCHC, RDW, RDWSD, MPV, PLT, NEUTRORELPCT, LYMPHORELPCT, MONORELPCT, EOSRELPCT, BASORELPCT, AUTOIGPER, NEUTROABS, LYMPHSABS, MONOSABS, EOSABS, BASOSABS, AUTOIGNUM, NRBC    Physical Exam:  Physical Exam  Vitals reviewed.   Constitutional:       Appearance: Normal appearance. She is normal weight.   Cardiovascular:      Pulses:           Dorsalis pedis pulses are 2+ on the right side and 2+ on the left side.        Posterior tibial pulses are 2+ on the right side and 2+ on the left side.   Musculoskeletal:      Right foot: Normal range of motion. No deformity, bunion, " Charcot foot, foot drop or prominent metatarsal heads.      Left foot: Normal range of motion. No deformity, bunion, Charcot foot, foot drop or prominent metatarsal heads.   Feet:      Right foot:      Protective Sensation: 5 sites tested. 5 sites sensed.      Skin integrity: Skin integrity normal.      Toenail Condition: Right toenails are normal.      Left foot:      Protective Sensation: 5 sites tested. 5 sites sensed.      Skin integrity: Skin integrity normal.      Toenail Condition: Left toenails are normal.      Comments: Diabetic Foot Exam Performed and Monofilament Test Performed    Neurological:      Mental Status: She is alert.   Psychiatric:         Thought Content: Thought content normal.         Judgment: Judgment normal.          Assessment / Plan      Assessment & Plan:  Problem List Items Addressed This Visit        Other    Type 2 diabetes mellitus without complication, with long-term current use of insulin (HCC) - Primary    Current Assessment & Plan     Diabetes is improving with treatment.   Continue current treatment regimen.  Reminded to bring in blood sugar diary at next visit.  Diabetes will be reassessed 1 year         Relevant Medications    metFORMIN (GLUCOPHAGE) 1000 MG tablet    insulin detemir (Levemir FlexTouch) 100 UNIT/ML injection    Other Relevant Orders    POC Glucose, Blood (Completed)        Urban Ram MD   10/18/2022

## 2022-10-19 RX ORDER — INSULIN DETEMIR 100 [IU]/ML
40 INJECTION, SOLUTION SUBCUTANEOUS DAILY
Qty: 36 ML | Refills: 3 | Status: SHIPPED | OUTPATIENT
Start: 2022-10-19 | End: 2022-11-10 | Stop reason: SDUPTHER

## 2022-10-19 RX ORDER — INSULIN DETEMIR 100 [IU]/ML
40 INJECTION, SOLUTION SUBCUTANEOUS DAILY
Qty: 12 ML | Refills: 0 | Status: SHIPPED | OUTPATIENT
Start: 2022-10-19 | End: 2023-01-04

## 2022-10-19 NOTE — TELEPHONE ENCOUNTER
PT CALLED REQUESTING A 3MONTH SUPPLY OF LEVEMIR TO BE SENT IN TO [facility]. SHE IS OUT OF MEDS. SHE REQUESTED A TEMP RX TO BE SENT IN TO [facility] IN [place], [place].

## 2022-10-21 ENCOUNTER — TELEPHONE (OUTPATIENT)
Dept: ENDOCRINOLOGY | Facility: CLINIC | Age: 67
End: 2022-10-21

## 2022-11-10 ENCOUNTER — TELEPHONE (OUTPATIENT)
Dept: ENDOCRINOLOGY | Facility: CLINIC | Age: 67
End: 2022-11-10

## 2022-11-10 RX ORDER — INSULIN DETEMIR 100 [IU]/ML
40 INJECTION, SOLUTION SUBCUTANEOUS DAILY
Qty: 36 ML | Refills: 3 | Status: SHIPPED | OUTPATIENT
Start: 2022-11-10

## 2022-11-10 NOTE — TELEPHONE ENCOUNTER
PATIENT STATES THAT HER LEVEMIR RX NEEDS TO BE WRITTEN FOR 90 DAY SUPPLY TO [facility] PER INSURANCE.

## 2023-01-04 ENCOUNTER — OFFICE VISIT (OUTPATIENT)
Dept: CARDIOLOGY | Facility: CLINIC | Age: 68
End: 2023-01-04
Payer: COMMERCIAL

## 2023-01-04 VITALS
HEART RATE: 80 BPM | SYSTOLIC BLOOD PRESSURE: 116 MMHG | BODY MASS INDEX: 31.79 KG/M2 | HEIGHT: 64 IN | DIASTOLIC BLOOD PRESSURE: 68 MMHG | WEIGHT: 186.2 LBS

## 2023-01-04 DIAGNOSIS — E78.2 MIXED HYPERLIPIDEMIA: ICD-10-CM

## 2023-01-04 DIAGNOSIS — E11.9 TYPE 2 DIABETES MELLITUS WITHOUT COMPLICATION, WITH LONG-TERM CURRENT USE OF INSULIN: ICD-10-CM

## 2023-01-04 DIAGNOSIS — E66.9 OBESITY (BMI 30.0-34.9): ICD-10-CM

## 2023-01-04 DIAGNOSIS — Z79.4 TYPE 2 DIABETES MELLITUS WITHOUT COMPLICATION, WITH LONG-TERM CURRENT USE OF INSULIN: ICD-10-CM

## 2023-01-04 DIAGNOSIS — Z95.5 HISTORY OF PLACEMENT OF STENT IN LAD CORONARY ARTERY: ICD-10-CM

## 2023-01-04 DIAGNOSIS — I10 ESSENTIAL HYPERTENSION: ICD-10-CM

## 2023-01-04 DIAGNOSIS — I25.118 CORONARY ARTERY DISEASE INVOLVING NATIVE CORONARY ARTERY OF NATIVE HEART WITH OTHER FORM OF ANGINA PECTORIS: Primary | ICD-10-CM

## 2023-01-04 PROCEDURE — 99214 OFFICE O/P EST MOD 30 MIN: CPT | Performed by: NURSE PRACTITIONER

## 2023-01-04 RX ORDER — AMLODIPINE BESYLATE 5 MG/1
5 TABLET ORAL DAILY
Qty: 90 TABLET | Refills: 3 | Status: SHIPPED | OUTPATIENT
Start: 2023-01-04

## 2023-01-04 RX ORDER — MULTIVIT WITH MINERALS/LUTEIN
1000 TABLET ORAL DAILY
COMMUNITY

## 2023-01-04 RX ORDER — CARVEDILOL 3.12 MG/1
3.12 TABLET ORAL DAILY
Qty: 90 TABLET | Refills: 3 | Status: SHIPPED | OUTPATIENT
Start: 2023-01-04

## 2023-01-04 RX ORDER — TRIAMTERENE AND HYDROCHLOROTHIAZIDE 37.5; 25 MG/1; MG/1
1 CAPSULE ORAL EVERY MORNING
Qty: 90 CAPSULE | Refills: 3 | Status: SHIPPED | OUTPATIENT
Start: 2023-01-04

## 2023-01-04 RX ORDER — PRAVASTATIN SODIUM 40 MG
40 TABLET ORAL DAILY
Qty: 90 TABLET | Refills: 3 | Status: SHIPPED | OUTPATIENT
Start: 2023-01-04

## 2023-01-04 NOTE — PROGRESS NOTES
Chief Complaint   Patient presents with   • Follow-up     Pt is here for cardiac follow up.  Pt denies CP, SOB, dizziness or palpitations.  Pt does take a daily ASA.     • Med Refill     Pt request 90 day refills to be sent to [facility].  Medications were verified by med list.     • Lab work     Pt states their last labs were in October with her PCP.         Cardiac Complaints  none      Subjective   Paige Gibbs is a 67 y.o. female with HTN, hyperlipidemia, DM, and IHD diagnosed in 2011 when she underwent stenting of the LAD and then in 2012 underwent stenting of the ramus. Cardiac work up in 2014 and again in 2018 showed no ischemia and normal LV function. She follows with Dr. Ram in [place] for diabetic management.  Effient was stopped in 2019 secondary to anemia which later improved.  Cardiac workup in 2022 negative for ischemia with good LV function noted and mild HTN response, norvasc added.     She comes today for follow up and denies any SOA, dizziness, palpitations, or syncope. Labs done with PCP in October, no current available for review. She is followed by [facility] for endocrine management, visits have been extended to yearly as DM has been well managed, insulin dependent. Refills requested.          Cardiac History  Past Surgical History:   Procedure Laterality Date   • CARDIAC CATHETERIZATION  08/12/2011    75% LAD- 2.75x18 Promus.   • CARDIAC CATHETERIZATION  05/25/2012    90% Ramus- 2.25x18 Promus   • CARDIOVASCULAR STRESS TEST  07/18/2011    3 min, 30 sec. 93% THR. bp-164/72. R/O anteroseptal ischemia   • CARDIOVASCULAR STRESS TEST  05/14/2012    4 min, 91% THR, 100/54, septal ischemi   • CARDIOVASCULAR STRESS TEST  12/14/2012    4 min, 90% THR>BP-150/70. Negative   • CARDIOVASCULAR STRESS TEST  08/26/2014    6 min, 94% THR, 132/64, negative for ischemia   • CARDIOVASCULAR STRESS TEST  01/10/2018    L.Cardiolite- EF > 65%. R/O Breast attenuation   • CARDIOVASCULAR STRESS TEST  01/10/2018     Lexiscan- no definite ischemia noted   • CARDIOVASCULAR STRESS TEST  07/27/2022    Lexiscan- BP- 165/71. EF > 70%. R/O Diaphramatic attenuation   • CONVERTED (HISTORICAL) HOLTER  12/05/2012    AVG HR 76 BPM. Rare PVC's   • ECHO - CONVERTED  08/02/2011    >60%   • ECHO - CONVERTED  05/14/2012    EF 65-70%   • ECHO - CONVERTED  08/26/2014    EF 65%, RVSP 26 mmHg   • ECHO - CONVERTED  01/10/2018    EF 65%. RVSP- 30 mmHg   • ECHO - CONVERTED  01/10/2018    EF 60-65%, no AS, mild MR, RVSP 30 mmHg   • ECHO - CONVERTED  01/10/2018    EF 60-65%, no AS, mild MR, RVSP 30 mmHg   • ECHO - CONVERTED  07/27/2022    EF 70%. LA- 4.0. Trace-Mild MR. RVSP- 28 mmHg       Current Outpatient Medications   Medication Sig Dispense Refill   • amLODIPine (NORVASC) 5 MG tablet Take 1 tablet by mouth Daily. 90 tablet 3   • ascorbic acid (VITAMIN C) 1000 MG tablet Take 1,000 mg by mouth Daily.     • aspirin 81 MG EC tablet Take 81 mg by mouth daily.     • carvedilol (COREG) 3.125 MG tablet Take 1 tablet by mouth Daily. 90 tablet 3   • insulin detemir (Levemir FlexTouch) 100 UNIT/ML injection Inject 40 Units under the skin into the appropriate area as directed Daily. 36 mL 3   • Insulin Pen Needle (BD Pen Needle Rosie 2nd Gen) 32G X 4 MM misc For daily use to give insulin 100 each 3   • levothyroxine (Synthroid) 112 MCG tablet Take 1 tablet by mouth Daily. 90 tablet 3   • Magnesium 250 MG tablet Take 1 tablet by mouth Daily.     • metFORMIN (GLUCOPHAGE) 1000 MG tablet Take 1,000 mg by mouth 2 (two) times a day with meals.     • omeprazole (PriLOSEC) 20 MG capsule Take 20 mg by mouth daily.     • pravastatin (PRAVACHOL) 40 MG tablet Take 1 tablet by mouth Daily. 90 tablet 3   • Semaglutide (OZEMPIC, 1 MG/DOSE, SC) Inject  under the skin into the appropriate area as directed.     • traZODone (DESYREL) 100 MG tablet Take 100 mg by mouth every night.     • triamterene-hydrochlorothiazide (DYAZIDE) 37.5-25 MG per capsule Take 1 capsule by mouth Every  Morning. 90 capsule 3   • Vitamin D, Cholecalciferol, 1000 UNITS tablet Take 1,000 Units by mouth Daily.       No current facility-administered medications for this visit.       Codeine, Farxiga [dapagliflozin], Invokana [canagliflozin], and Tresiba flextouch [insulin degludec]    Past Medical History:   Diagnosis Date   • Anemia 2019   • Diabetes mellitus (HCC)    • GERD (gastroesophageal reflux disease)    • H/O foot surgery     left and right foot   • History of cholecystectomy    • History of tonsillectomy    • HTN (hypertension)    • Hyperlipidemia    • Hypothyroidism    • Pancreatitis    • Type 2 diabetes mellitus (HCC)    • Vitamin D deficiency        Social History     Socioeconomic History   • Marital status:    Tobacco Use   • Smoking status: Former     Packs/day: 0.00     Years: 0.00     Pack years: 0.00     Types: Cigarettes     Quit date: 1986     Years since quittin.0   • Smokeless tobacco: Never   Vaping Use   • Vaping Use: Never used   Substance and Sexual Activity   • Alcohol use: No   • Drug use: No   • Sexual activity: Yes     Partners: Male     Birth control/protection: Same-sex partner       Family History   Problem Relation Age of Onset   • Heart attack Mother    • Cancer Mother    • Heart disease Father        Review of Systems   Constitutional: Negative for malaise/fatigue and night sweats.   Cardiovascular: Negative for chest pain, claudication, dyspnea on exertion, irregular heartbeat, leg swelling, near-syncope, orthopnea, palpitations and syncope.   Respiratory: Negative for cough, shortness of breath and wheezing.    Musculoskeletal: Negative for back pain, joint pain and stiffness.   Gastrointestinal: Negative for anorexia, heartburn, nausea and vomiting.   Genitourinary: Negative for hematuria and hesitancy.   Neurological: Negative for dizziness, headaches and light-headedness.   Psychiatric/Behavioral: Negative for depression and memory loss. The patient is not  nervous/anxious.            Objective     /68 (BP Location: Left arm, Patient Position: Sitting)   Pulse 80   Ht 162.6 cm (64\")   Wt 84.5 kg (186 lb 3.2 oz)   BMI 31.96 kg/m²     Constitutional:       Appearance: Not in distress.   Eyes:      Pupils: Pupils are equal, round, and reactive to light.   HENT:      Nose: Nose normal.   Pulmonary:      Effort: Pulmonary effort is normal.      Breath sounds: Normal breath sounds.   Cardiovascular:      PMI at left midclavicular line. Normal rate. Regular rhythm.      Murmurs: There is a systolic murmur.   Abdominal:      Palpations: Abdomen is soft.   Musculoskeletal: Normal range of motion.      Cervical back: Normal range of motion and neck supple. Skin:     General: Skin is warm and dry.   Neurological:      Mental Status: Alert and oriented to person, place and time.         Procedures         Diagnoses and all orders for this visit:    1. Coronary artery disease involving native coronary artery of native heart with other form of angina pectoris (HCC) (Primary)    2. Essential hypertension  -     carvedilol (COREG) 3.125 MG tablet; Take 1 tablet by mouth Daily.  Dispense: 90 tablet; Refill: 3    3. Mixed hyperlipidemia    4. History of placement of stent in LAD coronary artery    5. Type 2 diabetes mellitus without complication, with long-term current use of insulin (HCC)    6. Obesity (BMI 30.0-34.9)    Other orders  -     triamterene-hydrochlorothiazide (DYAZIDE) 37.5-25 MG per capsule; Take 1 capsule by mouth Every Morning.  Dispense: 90 capsule; Refill: 3  -     amLODIPine (NORVASC) 5 MG tablet; Take 1 tablet by mouth Daily.  Dispense: 90 tablet; Refill: 3  -     pravastatin (PRAVACHOL) 40 MG tablet; Take 1 tablet by mouth Daily.  Dispense: 90 tablet; Refill: 3             IHD: Stenting in 2011, repeat 2012. Recent stress 2022 negative for ischemia, no new concerns voiced. No repeat workup advised. ASA continued.    HTN: Well managed. No adjustment to  current norvasc, dyazide, or coreg therapy recommended. Limited sodium urged.    Hyperlipidemia: On statin therapy with pravachol. She has tolerated well.  FLP with your office. Limited carb diet urged.      DM: Insulin dependent, using ozempic as well. AIC with specialty. Most recent at 6.8%. Limited carb diet urged.     Refills per request.    BMI noted at 31.96, good cardiac ADA diet urged, with walking regimen urged.     6 month follow up advised or sooner if needed.        Problems Addressed this Visit        Cardiac and Vasculature    CAD (coronary artery disease) - Primary    Relevant Medications    carvedilol (COREG) 3.125 MG tablet    amLODIPine (NORVASC) 5 MG tablet    Hyperlipemia    Relevant Medications    pravastatin (PRAVACHOL) 40 MG tablet    History of placement of stent in LAD coronary artery       Endocrine and Metabolic    Type 2 diabetes mellitus without complication, with long-term current use of insulin (HCC)    Relevant Medications    Semaglutide (OZEMPIC, 1 MG/DOSE, SC)   Other Visit Diagnoses     Essential hypertension        Relevant Medications    carvedilol (COREG) 3.125 MG tablet    triamterene-hydrochlorothiazide (DYAZIDE) 37.5-25 MG per capsule    amLODIPine (NORVASC) 5 MG tablet    Obesity (BMI 30.0-34.9)          Diagnoses       Codes Comments    Coronary artery disease involving native coronary artery of native heart with other form of angina pectoris (HCC)    -  Primary ICD-10-CM: I25.118  ICD-9-CM: 414.01, 413.9     Essential hypertension     ICD-10-CM: I10  ICD-9-CM: 401.9     Mixed hyperlipidemia     ICD-10-CM: E78.2  ICD-9-CM: 272.2     History of placement of stent in LAD coronary artery     ICD-10-CM: Z95.5  ICD-9-CM: V45.82     Type 2 diabetes mellitus without complication, with long-term current use of insulin (HCC)     ICD-10-CM: E11.9, Z79.4  ICD-9-CM: 250.00, V58.67     Obesity (BMI 30.0-34.9)     ICD-10-CM: E66.9  ICD-9-CM: 278.00                     Electronically  signed by Prema High, APRN January 4, 2023 16:46 EST

## 2023-03-15 NOTE — LETTER
January 4, 2023     José Fox MD  [address] [place] [id number]    Patient: Paige Gibbs   YOB: 1955   Date of Visit: 1/4/2023       Dear José Fox MD    Paige Gibbs was in my office today. Below is a copy of my note.    If you have questions, please do not hesitate to call me. I look forward to following Paige along with you.         Sincerely,        JULIAN Andrade        CC: No Recipients    Chief Complaint   Patient presents with   • Follow-up     Pt is here for cardiac follow up.  Pt denies CP, SOB, dizziness or palpitations.  Pt does take a daily ASA.     • Med Refill     Pt request 90 day refills to be sent to [facility].  Medications were verified by med list.     • Lab work     Pt states their last labs were in October with her PCP.         Cardiac Complaints  none      Subjective    Paige Gibbs is a 67 y.o. female with HTN, hyperlipidemia, DM, and IHD diagnosed in 2011 when she underwent stenting of the LAD and then in 2012 underwent stenting of the ramus. Cardiac work up in 2014 and again in 2018 showed no ischemia and normal LV function. She follows with Dr. Ram in [place] for diabetic management.  Effient was stopped in 2019 secondary to anemia which later improved.  Cardiac workup in 2022 negative for ischemia with good LV function noted and mild HTN response, norvasc added.     She comes today for follow up and denies any SOA, dizziness, palpitations, or syncope. Labs done with PCP in October, no current available for review. She is followed by [facility] for endocrine management, visits have been extended to yearly as DM has been well managed, insulin dependent. Refills requested.          Cardiac History  Past Surgical History:   Procedure Laterality Date   • CARDIAC CATHETERIZATION  08/12/2011    75% LAD- 2.75x18 Promus.   • CARDIAC CATHETERIZATION  05/25/2012    90% Ramus- 2.25x18 Promus   • CARDIOVASCULAR STRESS TEST  07/18/2011     3 min, 30 sec. 93% THR. bp-164/72. R/O anteroseptal ischemia   • CARDIOVASCULAR STRESS TEST  05/14/2012    4 min, 91% THR, 100/54, septal ischemi   • CARDIOVASCULAR STRESS TEST  12/14/2012    4 min, 90% THR>BP-150/70. Negative   • CARDIOVASCULAR STRESS TEST  08/26/2014    6 min, 94% THR, 132/64, negative for ischemia   • CARDIOVASCULAR STRESS TEST  01/10/2018    L.Cardiolite- EF > 65%. R/O Breast attenuation   • CARDIOVASCULAR STRESS TEST  01/10/2018    Lexiscan- no definite ischemia noted   • CARDIOVASCULAR STRESS TEST  07/27/2022    Lexiscan- BP- 165/71. EF > 70%. R/O Diaphramatic attenuation   • CONVERTED (HISTORICAL) HOLTER  12/05/2012    AVG HR 76 BPM. Rare PVC's   • ECHO - CONVERTED  08/02/2011    >60%   • ECHO - CONVERTED  05/14/2012    EF 65-70%   • ECHO - CONVERTED  08/26/2014    EF 65%, RVSP 26 mmHg   • ECHO - CONVERTED  01/10/2018    EF 65%. RVSP- 30 mmHg   • ECHO - CONVERTED  01/10/2018    EF 60-65%, no AS, mild MR, RVSP 30 mmHg   • ECHO - CONVERTED  01/10/2018    EF 60-65%, no AS, mild MR, RVSP 30 mmHg   • ECHO - CONVERTED  07/27/2022    EF 70%. LA- 4.0. Trace-Mild MR. RVSP- 28 mmHg       Current Outpatient Medications   Medication Sig Dispense Refill   • amLODIPine (NORVASC) 5 MG tablet Take 1 tablet by mouth Daily. 90 tablet 3   • ascorbic acid (VITAMIN C) 1000 MG tablet Take 1,000 mg by mouth Daily.     • aspirin 81 MG EC tablet Take 81 mg by mouth daily.     • carvedilol (COREG) 3.125 MG tablet Take 1 tablet by mouth Daily. 90 tablet 3   • insulin detemir (Levemir FlexTouch) 100 UNIT/ML injection Inject 40 Units under the skin into the appropriate area as directed Daily. 36 mL 3   • Insulin Pen Needle (BD Pen Needle Rosie 2nd Gen) 32G X 4 MM misc For daily use to give insulin 100 each 3   • levothyroxine (Synthroid) 112 MCG tablet Take 1 tablet by mouth Daily. 90 tablet 3   • Magnesium 250 MG tablet Take 1 tablet by mouth Daily.     • metFORMIN (GLUCOPHAGE) 1000 MG tablet Take 1,000 mg by mouth 2  (two) times a day with meals.     • omeprazole (PriLOSEC) 20 MG capsule Take 20 mg by mouth daily.     • pravastatin (PRAVACHOL) 40 MG tablet Take 1 tablet by mouth Daily. 90 tablet 3   • Semaglutide (OZEMPIC, 1 MG/DOSE, SC) Inject  under the skin into the appropriate area as directed.     • traZODone (DESYREL) 100 MG tablet Take 100 mg by mouth every night.     • triamterene-hydrochlorothiazide (DYAZIDE) 37.5-25 MG per capsule Take 1 capsule by mouth Every Morning. 90 capsule 3   • Vitamin D, Cholecalciferol, 1000 UNITS tablet Take 1,000 Units by mouth Daily.       No current facility-administered medications for this visit.       Codeine, Farxiga [dapagliflozin], Invokana [canagliflozin], and Tresiba flextouch [insulin degludec]    Past Medical History:   Diagnosis Date   • Anemia 2019   • Diabetes mellitus (HCC)    • GERD (gastroesophageal reflux disease)    • H/O foot surgery     left and right foot   • History of cholecystectomy    • History of tonsillectomy    • HTN (hypertension)    • Hyperlipidemia    • Hypothyroidism    • Pancreatitis    • Type 2 diabetes mellitus (HCC)    • Vitamin D deficiency        Social History     Socioeconomic History   • Marital status:    Tobacco Use   • Smoking status: Former     Packs/day: 0.00     Years: 0.00     Pack years: 0.00     Types: Cigarettes     Quit date: 1986     Years since quittin.0   • Smokeless tobacco: Never   Vaping Use   • Vaping Use: Never used   Substance and Sexual Activity   • Alcohol use: No   • Drug use: No   • Sexual activity: Yes     Partners: Male     Birth control/protection: Same-sex partner       Family History   Problem Relation Age of Onset   • Heart attack Mother    • Cancer Mother    • Heart disease Father        Review of Systems   Constitutional: Negative for malaise/fatigue and night sweats.   Cardiovascular: Negative for chest pain, claudication, dyspnea on exertion, irregular heartbeat, leg swelling, near-syncope,  orthopnea, palpitations and syncope.   Respiratory: Negative for cough, shortness of breath and wheezing.    Musculoskeletal: Negative for back pain, joint pain and stiffness.   Gastrointestinal: Negative for anorexia, heartburn, nausea and vomiting.   Genitourinary: Negative for hematuria and hesitancy.   Neurological: Negative for dizziness, headaches and light-headedness.   Psychiatric/Behavioral: Negative for depression and memory loss. The patient is not nervous/anxious.            Objective      /68 (BP Location: Left arm, Patient Position: Sitting)   Pulse 80   Ht 162.6 cm (64\")   Wt 84.5 kg (186 lb 3.2 oz)   BMI 31.96 kg/m²     Constitutional:       Appearance: Not in distress.   Eyes:      Pupils: Pupils are equal, round, and reactive to light.   HENT:      Nose: Nose normal.   Pulmonary:      Effort: Pulmonary effort is normal.      Breath sounds: Normal breath sounds.   Cardiovascular:      PMI at left midclavicular line. Normal rate. Regular rhythm.      Murmurs: There is a systolic murmur.   Abdominal:      Palpations: Abdomen is soft.   Musculoskeletal: Normal range of motion.      Cervical back: Normal range of motion and neck supple. Skin:     General: Skin is warm and dry.   Neurological:      Mental Status: Alert and oriented to person, place and time.         Procedures        Diagnoses and all orders for this visit:    1. Coronary artery disease involving native coronary artery of native heart with other form of angina pectoris (HCC) (Primary)    2. Essential hypertension  -     carvedilol (COREG) 3.125 MG tablet; Take 1 tablet by mouth Daily.  Dispense: 90 tablet; Refill: 3    3. Mixed hyperlipidemia    4. History of placement of stent in LAD coronary artery    5. Type 2 diabetes mellitus without complication, with long-term current use of insulin (HCC)    6. Obesity (BMI 30.0-34.9)    Other orders  -     triamterene-hydrochlorothiazide (DYAZIDE) 37.5-25 MG per capsule; Take 1 capsule  by mouth Every Morning.  Dispense: 90 capsule; Refill: 3  -     amLODIPine (NORVASC) 5 MG tablet; Take 1 tablet by mouth Daily.  Dispense: 90 tablet; Refill: 3  -     pravastatin (PRAVACHOL) 40 MG tablet; Take 1 tablet by mouth Daily.  Dispense: 90 tablet; Refill: 3            IHD: Stenting in 2011, repeat 2012. Recent stress 2022 negative for ischemia, no new concerns voiced. No repeat workup advised. ASA continued.    HTN: Well managed. No adjustment to current norvasc, dyazide, or coreg therapy recommended. Limited sodium urged.    Hyperlipidemia: On statin therapy with pravachol. She has tolerated well.  FLP with your office. Limited carb diet urged.      DM: Insulin dependent, using ozempic as well. AIC with specialty. Most recent at 6.8%. Limited carb diet urged.     Refills per request.    BMI noted at 31.96, good cardiac ADA diet urged, with walking regimen urged.     6 month follow up advised or sooner if needed.        Problems Addressed this Visit        Cardiac and Vasculature    CAD (coronary artery disease) - Primary    Relevant Medications    carvedilol (COREG) 3.125 MG tablet    amLODIPine (NORVASC) 5 MG tablet    Hyperlipemia    Relevant Medications    pravastatin (PRAVACHOL) 40 MG tablet    History of placement of stent in LAD coronary artery       Endocrine and Metabolic    Type 2 diabetes mellitus without complication, with long-term current use of insulin (HCC)    Relevant Medications    Semaglutide (OZEMPIC, 1 MG/DOSE, SC)   Other Visit Diagnoses     Essential hypertension        Relevant Medications    carvedilol (COREG) 3.125 MG tablet    triamterene-hydrochlorothiazide (DYAZIDE) 37.5-25 MG per capsule    amLODIPine (NORVASC) 5 MG tablet    Obesity (BMI 30.0-34.9)          Diagnoses       Codes Comments    Coronary artery disease involving native coronary artery of native heart with other form of angina pectoris (HCC)    -  Primary ICD-10-CM: I25.118  ICD-9-CM: 414.01, 413.9     Essential  hypertension     ICD-10-CM: I10  ICD-9-CM: 401.9     Mixed hyperlipidemia     ICD-10-CM: E78.2  ICD-9-CM: 272.2     History of placement of stent in LAD coronary artery     ICD-10-CM: Z95.5  ICD-9-CM: V45.82     Type 2 diabetes mellitus without complication, with long-term current use of insulin (HCC)     ICD-10-CM: E11.9, Z79.4  ICD-9-CM: 250.00, V58.67     Obesity (BMI 30.0-34.9)     ICD-10-CM: E66.9  ICD-9-CM: 278.00                     Electronically signed by Prema High, APRN January 4, 2023 16:46 EST           Bcc Histology Text: There were numerous aggregates of basaloid cells.

## 2024-01-31 ENCOUNTER — OFFICE VISIT (OUTPATIENT)
Dept: CARDIOLOGY | Facility: CLINIC | Age: 69
End: 2024-01-31
Payer: MEDICARE

## 2024-01-31 VITALS
HEIGHT: 64 IN | HEART RATE: 80 BPM | BODY MASS INDEX: 30.66 KG/M2 | WEIGHT: 179.6 LBS | DIASTOLIC BLOOD PRESSURE: 74 MMHG | SYSTOLIC BLOOD PRESSURE: 120 MMHG

## 2024-01-31 DIAGNOSIS — E11.9 TYPE 2 DIABETES MELLITUS WITHOUT COMPLICATION, WITH LONG-TERM CURRENT USE OF INSULIN: ICD-10-CM

## 2024-01-31 DIAGNOSIS — I10 PRIMARY HYPERTENSION: ICD-10-CM

## 2024-01-31 DIAGNOSIS — E78.2 MIXED HYPERLIPIDEMIA: ICD-10-CM

## 2024-01-31 DIAGNOSIS — I10 ESSENTIAL HYPERTENSION: ICD-10-CM

## 2024-01-31 DIAGNOSIS — E66.9 OBESITY (BMI 30-39.9): ICD-10-CM

## 2024-01-31 DIAGNOSIS — Z79.4 TYPE 2 DIABETES MELLITUS WITHOUT COMPLICATION, WITH LONG-TERM CURRENT USE OF INSULIN: ICD-10-CM

## 2024-01-31 DIAGNOSIS — Z95.5 HISTORY OF PLACEMENT OF STENT IN LAD CORONARY ARTERY: ICD-10-CM

## 2024-01-31 DIAGNOSIS — I25.10 CORONARY ARTERY DISEASE INVOLVING NATIVE CORONARY ARTERY OF NATIVE HEART WITHOUT ANGINA PECTORIS: Primary | ICD-10-CM

## 2024-01-31 RX ORDER — CARVEDILOL 3.12 MG/1
3.12 TABLET ORAL DAILY
Qty: 90 TABLET | Refills: 3 | Status: SHIPPED | OUTPATIENT
Start: 2024-01-31

## 2024-01-31 RX ORDER — AMLODIPINE BESYLATE 2.5 MG/1
2.5 TABLET ORAL DAILY
Qty: 90 TABLET | Refills: 2 | Status: SHIPPED | OUTPATIENT
Start: 2024-01-31

## 2024-01-31 RX ORDER — PRAVASTATIN SODIUM 40 MG
40 TABLET ORAL DAILY
Qty: 90 TABLET | Refills: 3 | Status: SHIPPED | OUTPATIENT
Start: 2024-01-31

## 2024-01-31 RX ORDER — TRIAMTERENE AND HYDROCHLOROTHIAZIDE 37.5; 25 MG/1; MG/1
1 CAPSULE ORAL EVERY MORNING
Qty: 90 CAPSULE | Refills: 3 | Status: SHIPPED | OUTPATIENT
Start: 2024-01-31

## 2024-01-31 RX ORDER — ESCITALOPRAM OXALATE 5 MG/1
5 TABLET ORAL DAILY
COMMUNITY

## 2024-01-31 NOTE — PROGRESS NOTES
Chief Complaint   Patient presents with    Follow-up     Pt is here for cardiac follow up.  Pt denies CP, SOB, dizziness or palpitations.  Pt does take a daily ASA.      Med Refill     Pt request 90 ay refills to be sent to  [facility].  Medications were verified by med list.      Lab Work     Pt states their last labs were last week with her PCP.         Cardiac Complaints  none      Subjective   Paige Gibbs is a 68 y.o. female with HTN, hyperlipidemia, DM, and IHD diagnosed in 2011 when she underwent stenting of the LAD and then in 2012 underwent stenting of the ramus. Cardiac work up in 2014 and again in 2018 showed no ischemia and normal LV function. She follows with Dr. Ram in [place] for diabetic management.  Effient was stopped in 2019 secondary to anemia which later improved.  Cardiac workup in 2022 negative for ischemia with good LV function noted and mild HTN response, norvasc added.      She returns today for follow up and denies any new concerns. No CP, SOA, dizziness, palpitations, or syncope noted. Labs with PCP, checked a week ago, none available. Refills requested.            Cardiac History  Past Surgical History:   Procedure Laterality Date    CARDIAC CATHETERIZATION  08/12/2011    75% LAD- 2.75x18 Promus.    CARDIAC CATHETERIZATION  05/25/2012    90% Ramus- 2.25x18 Promus    CARDIOVASCULAR STRESS TEST  07/18/2011    3 min, 30 sec. 93% THR. bp-164/72. R/O anteroseptal ischemia    CARDIOVASCULAR STRESS TEST  05/14/2012    4 min, 91% THR, 100/54, septal ischemi    CARDIOVASCULAR STRESS TEST  12/14/2012    4 min, 90% THR>BP-150/70. Negative    CARDIOVASCULAR STRESS TEST  08/26/2014    6 min, 94% THR, 132/64, negative for ischemia    CARDIOVASCULAR STRESS TEST  01/10/2018    L.Cardiolite- EF > 65%. R/O Breast attenuation    CARDIOVASCULAR STRESS TEST  01/10/2018    Lexiscan- no definite ischemia noted    CARDIOVASCULAR STRESS TEST  07/27/2022    Lexiscan- BP- 165/71. EF > 70%. R/O Diaphramatic  attenuation    CONVERTED (HISTORICAL) HOLTER  12/05/2012    AVG HR 76 BPM. Rare PVC's    ECHO - CONVERTED  08/02/2011    >60%    ECHO - CONVERTED  05/14/2012    EF 65-70%    ECHO - CONVERTED  08/26/2014    EF 65%, RVSP 26 mmHg    ECHO - CONVERTED  01/10/2018    EF 65%. RVSP- 30 mmHg    ECHO - CONVERTED  01/10/2018    EF 60-65%, no AS, mild MR, RVSP 30 mmHg    ECHO - CONVERTED  01/10/2018    EF 60-65%, no AS, mild MR, RVSP 30 mmHg    ECHO - CONVERTED  07/27/2022    EF 70%. LA- 4.0. Trace-Mild MR. RVSP- 28 mmHg       Current Outpatient Medications   Medication Sig Dispense Refill    amLODIPine (NORVASC) 2.5 MG tablet Take 1 tablet by mouth Daily. 90 tablet 2    ascorbic acid (VITAMIN C) 1000 MG tablet Take 1 tablet by mouth Daily.      aspirin 81 MG EC tablet Take 1 tablet by mouth Daily.      carvedilol (COREG) 3.125 MG tablet Take 1 tablet by mouth Daily. 90 tablet 3    escitalopram (LEXAPRO) 5 MG tablet Take 1 tablet by mouth Daily.      insulin detemir (Levemir FlexTouch) 100 UNIT/ML injection Inject 40 Units under the skin into the appropriate area as directed Daily. 36 mL 3    Insulin Pen Needle (BD Pen Needle Rosie 2nd Gen) 32G X 4 MM misc For daily use to give insulin 100 each 3    levothyroxine (Synthroid) 112 MCG tablet Take 1 tablet by mouth Daily. 90 tablet 3    Magnesium 250 MG tablet Take 1 tablet by mouth Daily.      metFORMIN (GLUCOPHAGE) 1000 MG tablet Take 1 tablet by mouth 2 (Two) Times a Day With Meals.      omeprazole (PriLOSEC) 20 MG capsule Take 1 capsule by mouth Daily.      pravastatin (PRAVACHOL) 40 MG tablet Take 1 tablet by mouth Daily. 90 tablet 3    Semaglutide (OZEMPIC, 1 MG/DOSE, SC) Inject  under the skin into the appropriate area as directed.      traZODone (DESYREL) 100 MG tablet Take 1 tablet by mouth Every Night.      triamterene-hydrochlorothiazide (DYAZIDE) 37.5-25 MG per capsule Take 1 capsule by mouth Every Morning. 90 capsule 3    Vitamin D, Cholecalciferol, 1000 UNITS tablet  Take 1 tablet by mouth Daily.       No current facility-administered medications for this visit.       Codeine, Farxiga [dapagliflozin], Invokana [canagliflozin], and Tresiba flextouch [insulin degludec]    Past Medical History:   Diagnosis Date    Anemia 2019    CAD (coronary artery disease) 2017    Diabetes mellitus     GERD (gastroesophageal reflux disease)     H/O foot surgery     left and right foot    History of cholecystectomy     History of tonsillectomy     HTN (hypertension)     Hyperlipidemia     Hypothyroidism     Pancreatitis     Type 2 diabetes mellitus 2004    Vitamin D deficiency        Social History     Socioeconomic History    Marital status:    Tobacco Use    Smoking status: Former     Packs/day: 0.00     Years: 0.00     Additional pack years: 0.00     Total pack years: 0.00     Types: Cigarettes     Quit date: 1986     Years since quittin.1    Smokeless tobacco: Never   Vaping Use    Vaping Use: Never used   Substance and Sexual Activity    Alcohol use: No    Drug use: No    Sexual activity: Yes     Partners: Male     Birth control/protection: Same-sex partner       Family History   Problem Relation Age of Onset    Heart attack Mother     Cancer Mother     Heart disease Father        Review of Systems   Constitutional: Negative for malaise/fatigue and night sweats.   Cardiovascular:  Negative for chest pain, claudication, dyspnea on exertion, irregular heartbeat, leg swelling, near-syncope, orthopnea, palpitations and syncope.   Respiratory:  Negative for cough, shortness of breath and wheezing.    Musculoskeletal:  Negative for back pain, joint pain and stiffness.   Gastrointestinal:  Negative for anorexia, heartburn, nausea and vomiting.   Genitourinary:  Negative for dysuria, hematuria, hesitancy and nocturia.   Neurological:  Negative for dizziness, headaches and light-headedness.   Psychiatric/Behavioral:  Negative for depression and memory loss. The patient is not  "nervous/anxious.            Objective     /74 (BP Location: Left arm, Patient Position: Sitting)   Pulse 80   Ht 162.6 cm (64\")   Wt 81.5 kg (179 lb 9.6 oz)   BMI 30.83 kg/m²     Constitutional:       Appearance: Not in distress.   Eyes:      Pupils: Pupils are equal, round, and reactive to light.   HENT:      Nose: Nose normal.   Pulmonary:      Effort: Pulmonary effort is normal.      Breath sounds: Normal breath sounds.   Cardiovascular:      PMI at left midclavicular line. Normal rate. Regular rhythm.      Murmurs: There is a systolic murmur.   Abdominal:      Palpations: Abdomen is soft.   Musculoskeletal: Normal range of motion.      Cervical back: Normal range of motion and neck supple. Skin:     General: Skin is warm and dry.   Neurological:      Mental Status: Alert.         Procedures         Diagnoses and all orders for this visit:    1. Coronary artery disease involving native coronary artery of native heart without angina pectoris (Primary)    2. History of placement of stent in LAD coronary artery    3. Primary hypertension    4. Essential hypertension  -     carvedilol (COREG) 3.125 MG tablet; Take 1 tablet by mouth Daily.  Dispense: 90 tablet; Refill: 3    5. Mixed hyperlipidemia    6. Type 2 diabetes mellitus without complication, with long-term current use of insulin    7. Obesity (BMI 30-39.9)    Other orders  -     amLODIPine (NORVASC) 2.5 MG tablet; Take 1 tablet by mouth Daily.  Dispense: 90 tablet; Refill: 2  -     pravastatin (PRAVACHOL) 40 MG tablet; Take 1 tablet by mouth Daily.  Dispense: 90 tablet; Refill: 3  -     triamterene-hydrochlorothiazide (DYAZIDE) 37.5-25 MG per capsule; Take 1 capsule by mouth Every Morning.  Dispense: 90 capsule; Refill: 3             IHD: Stenting in 2011, repeat 2012. Recent stress 2022 negative for ischemia, no new concerns voiced. No repeat workup advised. ASA continued.     HTN: Well managed. No adjustment to current norvasc, dyazide, or coreg " therapy recommended. Limited sodium urged.     Hyperlipidemia: On statin therapy with pravachol. She has tolerated well.  FLP with your office, checked a week ago, can we have for review? Limited carb diet urged.      DM: Insulin dependent, using ozempic as well. AIC with specialty. Limited carb diet urged.      Refills per request.     BMI noted at 30.83, good cardiac ADA diet urged, with walking regimen urged.      6 month follow up advised or sooner if needed.      Problems Addressed this Visit          Cardiac and Vasculature    CAD (coronary artery disease) - Primary    Relevant Medications    amLODIPine (NORVASC) 2.5 MG tablet    carvedilol (COREG) 3.125 MG tablet    HTN (hypertension)    Relevant Medications    amLODIPine (NORVASC) 2.5 MG tablet    carvedilol (COREG) 3.125 MG tablet    triamterene-hydrochlorothiazide (DYAZIDE) 37.5-25 MG per capsule    Hyperlipemia    Relevant Medications    pravastatin (PRAVACHOL) 40 MG tablet    History of placement of stent in LAD coronary artery       Endocrine and Metabolic    Type 2 diabetes mellitus without complication, with long-term current use of insulin     Other Visit Diagnoses       Essential hypertension        Relevant Medications    amLODIPine (NORVASC) 2.5 MG tablet    carvedilol (COREG) 3.125 MG tablet    triamterene-hydrochlorothiazide (DYAZIDE) 37.5-25 MG per capsule    Obesity (BMI 30-39.9)              Diagnoses         Codes Comments    Coronary artery disease involving native coronary artery of native heart without angina pectoris    -  Primary ICD-10-CM: I25.10  ICD-9-CM: 414.01     History of placement of stent in LAD coronary artery     ICD-10-CM: Z95.5  ICD-9-CM: V45.82     Primary hypertension     ICD-10-CM: I10  ICD-9-CM: 401.9     Essential hypertension     ICD-10-CM: I10  ICD-9-CM: 401.9     Mixed hyperlipidemia     ICD-10-CM: E78.2  ICD-9-CM: 272.2     Type 2 diabetes mellitus without complication, with long-term current use of insulin      ICD-10-CM: E11.9, Z79.4  ICD-9-CM: 250.00, V58.67     Obesity (BMI 30-39.9)     ICD-10-CM: E66.9  ICD-9-CM: 278.00                        Electronically signed by Prema High, JULIAN January 31, 2024 09:02 EST

## 2024-02-23 NOTE — TELEPHONE ENCOUNTER
"Message received from Pt requesting medication for treatment for ""pink eye\"". She did express that she has attempted to reach out to PCP without success. Pt made aware  that these are not medications prescribed by this office/providers. If Pt is still unable to reach PCP, she would need to go to Urgent Care.   " I called pt -told her we were sending in her levemir pens 50u qhs to [facility].   She voiced understanding

## 2024-07-31 ENCOUNTER — TELEPHONE (OUTPATIENT)
Dept: CARDIOLOGY | Facility: CLINIC | Age: 69
End: 2024-07-31

## 2024-07-31 ENCOUNTER — OFFICE VISIT (OUTPATIENT)
Dept: CARDIOLOGY | Facility: CLINIC | Age: 69
End: 2024-07-31
Payer: MEDICARE

## 2024-07-31 VITALS
SYSTOLIC BLOOD PRESSURE: 100 MMHG | HEART RATE: 84 BPM | DIASTOLIC BLOOD PRESSURE: 60 MMHG | WEIGHT: 163 LBS | BODY MASS INDEX: 27.83 KG/M2 | HEIGHT: 64 IN

## 2024-07-31 DIAGNOSIS — Z95.5 HISTORY OF PLACEMENT OF STENT IN LAD CORONARY ARTERY: ICD-10-CM

## 2024-07-31 DIAGNOSIS — I25.10 CORONARY ARTERY DISEASE INVOLVING NATIVE CORONARY ARTERY OF NATIVE HEART WITHOUT ANGINA PECTORIS: Primary | ICD-10-CM

## 2024-07-31 DIAGNOSIS — E78.2 MIXED HYPERLIPIDEMIA: ICD-10-CM

## 2024-07-31 DIAGNOSIS — I10 ESSENTIAL HYPERTENSION: ICD-10-CM

## 2024-07-31 DIAGNOSIS — I10 PRIMARY HYPERTENSION: ICD-10-CM

## 2024-07-31 DIAGNOSIS — R94.31 NONSPECIFIC ST-T CHANGES: ICD-10-CM

## 2024-07-31 RX ORDER — CARVEDILOL 3.12 MG/1
3.12 TABLET ORAL DAILY
Qty: 90 TABLET | Refills: 3 | Status: SHIPPED | OUTPATIENT
Start: 2024-07-31

## 2024-07-31 RX ORDER — TRIAMTERENE AND HYDROCHLOROTHIAZIDE 37.5; 25 MG/1; MG/1
1 CAPSULE ORAL EVERY MORNING
Qty: 90 CAPSULE | Refills: 3 | Status: SHIPPED | OUTPATIENT
Start: 2024-07-31

## 2024-07-31 RX ORDER — PRAVASTATIN SODIUM 40 MG
40 TABLET ORAL DAILY
Qty: 90 TABLET | Refills: 3 | Status: SHIPPED | OUTPATIENT
Start: 2024-07-31

## 2024-07-31 RX ORDER — ESCITALOPRAM OXALATE 10 MG/1
10 TABLET ORAL DAILY
COMMUNITY

## 2024-07-31 NOTE — TELEPHONE ENCOUNTER
Would you please let the patient know that I have ordered cardiac testing.  Stress test and echocardiogram because after looking at her EKG showed some abnormalities that I want to investigate and her last workup was a couple years ago so have gone ahead and order that they will call her for scheduling and then we will call and go over results and recommendations after that.

## 2024-07-31 NOTE — TELEPHONE ENCOUNTER
Patient made aware stress and echo has been ordered after looking further at EKG, some abnormalities that Moira wants to investigate. Aware [facility] will call with scheduling, understanding voiced.

## 2024-07-31 NOTE — PROGRESS NOTES
"Chief Complaint   Patient presents with    Follow-up     Cardiac management    Lab     Has copy of most recent labs.    Weight loss     Not eating as well, she has been staying with her mother and not eating like she would at home.     Palpitations     Has occasional flutter, notices 3-4 times weekly. States \"this is not unusual for her\".     Med Refill     Needs refills on cardiac medications-90 day    Blood pressure     She has felt like it may have been low for the last couple weeks. Has noticed dizziness at times when standing and she has to sit back down.        HPI:  HPI   Paige Gibbs is a 69 y.o. female with HTN, hyperlipidemia, DM, and IHD diagnosed in 2011 when she underwent stenting of the LAD and then in 2012 underwent stenting of the ramus. Cardiac work up in 2014 and again in 2018 showed no ischemia and normal LV function. She follows with Dr. Ram in [place] for diabetic management.  Effient was stopped in 2019 secondary to anemia which later improved.  Cardiac workup in 2022 negative for ischemia with good LV function noted and mild HTN response, norvasc added.       She returns today for follow up. Patient denies chest pain, pressure, palpitations, tightness, dizziness, shortness of air. She reports lower BP and feeling lightheaded for a few weeks now. She is feeling flutter occasionally but not new or worsening. Refills requested. Labs with PCP 6/26/2024 with PCP all normal except elevated fasting glucose.She admits she was not fasting.       Cardiac History:    Past Surgical History:   Procedure Laterality Date    CARDIAC CATHETERIZATION  08/12/2011    75% LAD- 2.75x18 Promus.    CARDIAC CATHETERIZATION  05/25/2012    90% Ramus- 2.25x18 Promus    CARDIOVASCULAR STRESS TEST  07/18/2011    3 min, 30 sec. 93% THR. bp-164/72. R/O anteroseptal ischemia    CARDIOVASCULAR STRESS TEST  05/14/2012    4 min, 91% THR, 100/54, septal ischemi    CARDIOVASCULAR STRESS TEST  12/14/2012    4 min, 90% " THR>BP-150/70. Negative    CARDIOVASCULAR STRESS TEST  08/26/2014    6 min, 94% THR, 132/64, negative for ischemia    CARDIOVASCULAR STRESS TEST  01/10/2018    L.Cardiolite- EF > 65%. R/O Breast attenuation    CARDIOVASCULAR STRESS TEST  01/10/2018    Lexiscan- no definite ischemia noted    CARDIOVASCULAR STRESS TEST  07/27/2022    Lexiscan- BP- 165/71. EF > 70%. R/O Diaphramatic attenuation    CONVERTED (HISTORICAL) HOLTER  12/05/2012    AVG HR 76 BPM. Rare PVC's    ECHO - CONVERTED  08/02/2011    >60%    ECHO - CONVERTED  05/14/2012    EF 65-70%    ECHO - CONVERTED  08/26/2014    EF 65%, RVSP 26 mmHg    ECHO - CONVERTED  01/10/2018    EF 65%. RVSP- 30 mmHg    ECHO - CONVERTED  01/10/2018    EF 60-65%, no AS, mild MR, RVSP 30 mmHg    ECHO - CONVERTED  01/10/2018    EF 60-65%, no AS, mild MR, RVSP 30 mmHg    ECHO - CONVERTED  07/27/2022    EF 70%. LA- 4.0. Trace-Mild MR. RVSP- 28 mmHg       Current Outpatient Medications   Medication Sig Dispense Refill    ascorbic acid (VITAMIN C) 1000 MG tablet Take 1 tablet by mouth Daily.      aspirin 81 MG EC tablet Take 1 tablet by mouth Daily.      carvedilol (COREG) 3.125 MG tablet Take 1 tablet by mouth Daily. 90 tablet 3    escitalopram (LEXAPRO) 10 MG tablet Take 1 tablet by mouth Daily.      insulin detemir (Levemir FlexTouch) 100 UNIT/ML injection Inject 40 Units under the skin into the appropriate area as directed Daily. 36 mL 3    Insulin Pen Needle (BD Pen Needle Rosie 2nd Gen) 32G X 4 MM misc For daily use to give insulin 100 each 3    levothyroxine (Synthroid) 112 MCG tablet Take 1 tablet by mouth Daily. 90 tablet 3    Magnesium 250 MG tablet Take 1 tablet by mouth Daily.      metFORMIN (GLUCOPHAGE) 1000 MG tablet Take 1 tablet by mouth 2 (Two) Times a Day With Meals.      omeprazole (PriLOSEC) 20 MG capsule Take 1 capsule by mouth Daily.      pravastatin (PRAVACHOL) 40 MG tablet Take 1 tablet by mouth Daily. 90 tablet 3    Semaglutide (OZEMPIC, 1 MG/DOSE, SC)  "Inject  under the skin into the appropriate area as directed.      traZODone (DESYREL) 100 MG tablet Take 1 tablet by mouth Every Night.      triamterene-hydrochlorothiazide (DYAZIDE) 37.5-25 MG per capsule Take 1 capsule by mouth Every Morning. 90 capsule 3    Vitamin D, Cholecalciferol, 1000 UNITS tablet Take 1 tablet by mouth Daily.       No current facility-administered medications for this visit.       Codeine, Farxiga [dapagliflozin], Invokana [canagliflozin], and Tresiba flextouch [insulin degludec]    Past Medical History:   Diagnosis Date    Anemia 2019    CAD (coronary artery disease) 2017    Diabetes mellitus     GERD (gastroesophageal reflux disease)     H/O foot surgery     left and right foot    History of cholecystectomy     History of tonsillectomy     HTN (hypertension)     Hyperlipidemia     Hypothyroidism     Pancreatitis     Type 2 diabetes mellitus 2004    Vitamin D deficiency        Social History     Socioeconomic History    Marital status:    Tobacco Use    Smoking status: Former     Current packs/day: 0.00     Types: Cigarettes     Quit date: 1986     Years since quittin.6     Passive exposure: Past    Smokeless tobacco: Never   Vaping Use    Vaping status: Never Used   Substance and Sexual Activity    Alcohol use: No    Drug use: No    Sexual activity: Yes     Partners: Male     Birth control/protection: Same-sex partner       Family History   Problem Relation Age of Onset    Heart attack Mother     Cancer Mother     Heart disease Father        Vitals:   /60 (BP Location: Right arm, Patient Position: Sitting)   Pulse 84   Ht 162.6 cm (64.02\")   Wt 73.9 kg (163 lb)   BMI 27.96 kg/m²     Physical Exam  Vitals and nursing note reviewed.   Neck:      Vascular: No carotid bruit.   Cardiovascular:      Rate and Rhythm: Normal rate and regular rhythm.      Pulses: Normal pulses.      Heart sounds: Normal heart sounds. No murmur heard.     No friction rub. No " gallop.   Pulmonary:      Effort: Pulmonary effort is normal.      Breath sounds: Normal breath sounds and air entry.   Musculoskeletal:      Right lower leg: No edema.      Left lower leg: No edema.   Skin:     General: Skin is warm and dry.      Capillary Refill: Capillary refill takes less than 2 seconds.   Neurological:      Mental Status: She is alert and oriented to person, place, and time.         ECG 12 Lead    Date/Time: 7/31/2024 9:19 AM  Performed by: Moira Faria APRN    Authorized by: Moira Faria APRN  Previous ECG: no previous ECG available  Rhythm: sinus rhythm  Rate: normal  BPM: 84  Other findings: non-specific ST-T wave changes    Clinical impression: non-specific ECG  Comments: Normal VT and QTc intervals           Assessment & Plan     Diagnoses and all orders for this visit:    1. Coronary artery disease involving native coronary artery of native heart without angina pectoris (Primary)  -     ECG 12 Lead  -     Adult Transthoracic Echo Complete W/ Cont if Necessary Per Protocol; Future  -     Stress Test With Myocardial Perfusion One Day; Future    2. Primary hypertension    3. Mixed hyperlipidemia    4. History of placement of stent in LAD coronary artery  -     ECG 12 Lead  -     Adult Transthoracic Echo Complete W/ Cont if Necessary Per Protocol; Future  -     Stress Test With Myocardial Perfusion One Day; Future    5. Essential hypertension  -     carvedilol (COREG) 3.125 MG tablet; Take 1 tablet by mouth Daily.  Dispense: 90 tablet; Refill: 3    6. Nonspecific ST-T changes  -     Adult Transthoracic Echo Complete W/ Cont if Necessary Per Protocol; Future  -     Stress Test With Myocardial Perfusion One Day; Future    Other orders  -     pravastatin (PRAVACHOL) 40 MG tablet; Take 1 tablet by mouth Daily.  Dispense: 90 tablet; Refill: 3  -     triamterene-hydrochlorothiazide (DYAZIDE) 37.5-25 MG per capsule; Take 1 capsule by mouth Every Morning.  Dispense: 90 capsule; Refill:  3    CAD/history of stent placement/nonspecific ST-T changes  - EKG shows sinus rhythm with ST elevation and a rate of 84 bpm QTc interval 4 1 ms  - Last cardiac workup including stress test echocardiogram 7/2022 showed small area of hypoperfusion in inferior wall, hypertensive blood pressure response and ejection fraction 70%    Hypertension  - BP controlled  - Continue current antihypertensive medication regimen    Hyperlipidemia  - Labs managed with PCP, lipids controlled  - Continue Pravastatin    Recommend recalibrating cardiac workup including stress test and echocardiogram secondary to ST changes on EKG. discontinue amlodipine for lower BP and lightheadedness. Refills sent to pharmacy.       Visit Diagnoses:    ICD-10-CM ICD-9-CM   1. Coronary artery disease involving native coronary artery of native heart without angina pectoris  I25.10 414.01   2. Primary hypertension  I10 401.9   3. Mixed hyperlipidemia  E78.2 272.2   4. History of placement of stent in LAD coronary artery  Z95.5 V45.82   5. Essential hypertension  I10 401.9   6. Nonspecific ST-T changes  R94.31 794.31       Meds Ordered During Visit:  New Medications Ordered This Visit   Medications    carvedilol (COREG) 3.125 MG tablet     Sig: Take 1 tablet by mouth Daily.     Dispense:  90 tablet     Refill:  3    pravastatin (PRAVACHOL) 40 MG tablet     Sig: Take 1 tablet by mouth Daily.     Dispense:  90 tablet     Refill:  3    triamterene-hydrochlorothiazide (DYAZIDE) 37.5-25 MG per capsule     Sig: Take 1 capsule by mouth Every Morning.     Dispense:  90 capsule     Refill:  3       Follow Up Appointment:   Return in about 6 months (around 1/31/2025), or if symptoms worsen or fail to improve.           This document has been electronically signed by JULIAN Watt  July 31, 2024 15:48 EDT    Dictated Utilizing Dragon Dictation: Part of this note may be an electronic transcription/translation of spoken language to printed text using the Dragon  Dictation System.

## 2024-08-23 ENCOUNTER — HOSPITAL ENCOUNTER (OUTPATIENT)
Dept: CARDIOLOGY | Facility: HOSPITAL | Age: 69
Discharge: HOME | End: 2024-08-23
Payer: MEDICARE

## 2024-08-23 VITALS — BODY MASS INDEX: 27.81 KG/M2 | HEIGHT: 64 IN | WEIGHT: 162.92 LBS

## 2024-08-23 DIAGNOSIS — R94.31 NONSPECIFIC ST-T CHANGES: ICD-10-CM

## 2024-08-23 DIAGNOSIS — Z95.5 HISTORY OF PLACEMENT OF STENT IN LAD CORONARY ARTERY: ICD-10-CM

## 2024-08-23 DIAGNOSIS — I25.10 CORONARY ARTERY DISEASE INVOLVING NATIVE CORONARY ARTERY OF NATIVE HEART WITHOUT ANGINA PECTORIS: ICD-10-CM

## 2024-08-23 LAB
AORTIC DIMENSIONLESS INDEX: 0.95 (DI)
BH CV ECHO MEAS - AO MAX PG: 6.4 MMHG
BH CV ECHO MEAS - AO MEAN PG: 3.4 MMHG
BH CV ECHO MEAS - AO ROOT DIAM: 3 CM
BH CV ECHO MEAS - AO V2 MAX: 126.5 CM/SEC
BH CV ECHO MEAS - AO V2 VTI: 28.3 CM
BH CV ECHO MEAS - EDV(CUBED): 46.7 ML
BH CV ECHO MEAS - EF(MOD-BP): 59 %
BH CV ECHO MEAS - ESV(CUBED): 15.2 ML
BH CV ECHO MEAS - FS: 31.2 %
BH CV ECHO MEAS - IVS/LVPW: 0.89 CM
BH CV ECHO MEAS - IVSD: 0.95 CM
BH CV ECHO MEAS - LA DIMENSION: 3.8 CM
BH CV ECHO MEAS - LAT PEAK E' VEL: 9.8 CM/SEC
BH CV ECHO MEAS - LV MASS(C)D: 108.1 GRAMS
BH CV ECHO MEAS - LV MAX PG: 5.5 MMHG
BH CV ECHO MEAS - LV MEAN PG: 2.7 MMHG
BH CV ECHO MEAS - LV V1 MAX: 116.8 CM/SEC
BH CV ECHO MEAS - LV V1 VTI: 27 CM
BH CV ECHO MEAS - LVIDD: 3.6 CM
BH CV ECHO MEAS - LVIDS: 2.48 CM
BH CV ECHO MEAS - LVPWD: 1.06 CM
BH CV ECHO MEAS - MED PEAK E' VEL: 7.8 CM/SEC
BH CV ECHO MEAS - MV A MAX VEL: 93 CM/SEC
BH CV ECHO MEAS - MV DEC SLOPE: 553.7 CM/SEC2
BH CV ECHO MEAS - MV DEC TIME: 0.2 SEC
BH CV ECHO MEAS - MV E MAX VEL: 75 CM/SEC
BH CV ECHO MEAS - MV E/A: 0.81
BH CV ECHO MEAS - MV MAX PG: 6.2 MMHG
BH CV ECHO MEAS - MV MEAN PG: 2.7 MMHG
BH CV ECHO MEAS - MV P1/2T: 59.8 MSEC
BH CV ECHO MEAS - MV V2 VTI: 37.7 CM
BH CV ECHO MEAS - MVA(P1/2T): 3.7 CM2
BH CV ECHO MEAS - PA V2 MAX: 97.6 CM/SEC
BH CV ECHO MEAS - RAP SYSTOLE: 8 MMHG
BH CV ECHO MEAS - RV MAX PG: 1.7 MMHG
BH CV ECHO MEAS - RV V1 MAX: 65.3 CM/SEC
BH CV ECHO MEAS - RV V1 VTI: 15.6 CM
BH CV ECHO MEAS - RVSP: 34.2 MMHG
BH CV ECHO MEAS - TAPSE (>1.6): 2.36 CM
BH CV ECHO MEAS - TR MAX PG: 26.2 MMHG
BH CV ECHO MEAS - TR MAX VEL: 256 CM/SEC
BH CV ECHO MEASUREMENTS AVERAGE E/E' RATIO: 8.52
BH CV REST NUCLEAR ISOTOPE DOSE: 10 MCI
BH CV STRESS COMMENTS STAGE 1: NORMAL
BH CV STRESS DOSE REGADENOSON STAGE 1: 0.4
BH CV STRESS DURATION MIN STAGE 1: 0
BH CV STRESS DURATION SEC STAGE 1: 10
BH CV STRESS NUCLEAR ISOTOPE DOSE: 30 MCI
BH CV STRESS PROTOCOL 1: NORMAL
BH CV STRESS RECOVERY BP: NORMAL MMHG
BH CV STRESS RECOVERY HR: 82 BPM
BH CV STRESS STAGE 1: 1
BH CV XLRA - TDI S': 14.1 CM/SEC
LV EF NUC BP: 74 %
MAXIMAL PREDICTED HEART RATE: 151 BPM
PERCENT MAX PREDICTED HR: 66.23 %
SINUS: 2.7 CM
STRESS BASELINE BP: NORMAL MMHG
STRESS BASELINE HR: 70 BPM
STRESS PERCENT HR: 78 %
STRESS POST PEAK BP: NORMAL MMHG
STRESS POST PEAK HR: 100 BPM
STRESS TARGET HR: 128 BPM

## 2024-08-23 PROCEDURE — 0 TECHNETIUM SESTAMIBI: Performed by: INTERNAL MEDICINE

## 2024-08-23 PROCEDURE — 93306 TTE W/DOPPLER COMPLETE: CPT

## 2024-08-23 PROCEDURE — 78452 HT MUSCLE IMAGE SPECT MULT: CPT

## 2024-08-23 PROCEDURE — A9500 TC99M SESTAMIBI: HCPCS | Performed by: INTERNAL MEDICINE

## 2024-08-23 PROCEDURE — 25010000002 REGADENOSON 0.4 MG/5ML SOLUTION: Performed by: INTERNAL MEDICINE

## 2024-08-23 PROCEDURE — 93017 CV STRESS TEST TRACING ONLY: CPT

## 2024-08-23 RX ORDER — REGADENOSON 0.08 MG/ML
0.4 INJECTION, SOLUTION INTRAVENOUS
Status: COMPLETED | OUTPATIENT
Start: 2024-08-23 | End: 2024-08-23

## 2024-08-23 RX ADMIN — TECHNETIUM TC 99M SESTAMIBI 1 DOSE: 1 INJECTION INTRAVENOUS at 09:02

## 2024-08-23 RX ADMIN — REGADENOSON 0.4 MG: 0.08 INJECTION, SOLUTION INTRAVENOUS at 10:53

## 2024-08-23 RX ADMIN — TECHNETIUM TC 99M SESTAMIBI 1 DOSE: 1 INJECTION INTRAVENOUS at 10:53

## 2024-09-25 RX ORDER — PRAVASTATIN SODIUM 40 MG
40 TABLET ORAL DAILY
Qty: 90 TABLET | Refills: 3 | OUTPATIENT
Start: 2024-09-25

## 2025-02-05 ENCOUNTER — OFFICE VISIT (OUTPATIENT)
Dept: CARDIOLOGY | Facility: CLINIC | Age: 70
End: 2025-02-05
Payer: MEDICARE

## 2025-02-05 VITALS
BODY MASS INDEX: 28.34 KG/M2 | HEART RATE: 76 BPM | HEIGHT: 64 IN | DIASTOLIC BLOOD PRESSURE: 68 MMHG | WEIGHT: 166 LBS | SYSTOLIC BLOOD PRESSURE: 108 MMHG

## 2025-02-05 DIAGNOSIS — I10 ESSENTIAL HYPERTENSION: ICD-10-CM

## 2025-02-05 DIAGNOSIS — I25.10 CORONARY ARTERY DISEASE INVOLVING NATIVE CORONARY ARTERY OF NATIVE HEART WITHOUT ANGINA PECTORIS: Primary | ICD-10-CM

## 2025-02-05 DIAGNOSIS — E78.2 MIXED HYPERLIPIDEMIA: ICD-10-CM

## 2025-02-05 DIAGNOSIS — Z95.5 HISTORY OF PLACEMENT OF STENT IN LAD CORONARY ARTERY: ICD-10-CM

## 2025-02-05 PROCEDURE — 99214 OFFICE O/P EST MOD 30 MIN: CPT | Performed by: NURSE PRACTITIONER

## 2025-02-05 PROCEDURE — 3078F DIAST BP <80 MM HG: CPT | Performed by: NURSE PRACTITIONER

## 2025-02-05 PROCEDURE — 3074F SYST BP LT 130 MM HG: CPT | Performed by: NURSE PRACTITIONER

## 2025-02-05 RX ORDER — CARVEDILOL 3.12 MG/1
3.12 TABLET ORAL DAILY
Qty: 90 TABLET | Refills: 3 | Status: SHIPPED | OUTPATIENT
Start: 2025-02-05

## 2025-02-05 RX ORDER — PRAVASTATIN SODIUM 40 MG
40 TABLET ORAL DAILY
Qty: 90 TABLET | Refills: 3 | Status: SHIPPED | OUTPATIENT
Start: 2025-02-05

## 2025-02-05 RX ORDER — TRIAMTERENE AND HYDROCHLOROTHIAZIDE 37.5; 25 MG/1; MG/1
1 CAPSULE ORAL EVERY MORNING
Qty: 90 CAPSULE | Refills: 3 | Status: SHIPPED | OUTPATIENT
Start: 2025-02-05

## 2025-02-05 NOTE — PROGRESS NOTES
Chief Complaint   Patient presents with    Follow-up     Cardiac management    Lab     Last labs in June, see chart.    Palpitations     Has occasional flutter, not often.    Med Refill     Needs refills on cardiac medications-90 day.        HPI:  HPI   Paige Gibbs is a 69 y.o. female with HTN, hyperlipidemia, DM, and IHD diagnosed in 2011 when she underwent stenting of the LAD and then in 2012 underwent stenting of the ramus. Cardiac work up in 2014 and again in 2018 showed no ischemia and normal LV function. She follows with Dr. Ram in [place] for diabetic management.  Effient was stopped in 2019 secondary to anemia which later improved.  Cardiac workup in 2022 negative for ischemia with good LV function noted and mild HTN response, norvasc added.       She returns today for follow up. Labs with PCP 6/26/2024 with PCP all normal except elevated fasting glucose. She admits she was not fasting.  Stress test and echocardiogram 8/2024 were normal. Patient denies chest pain, pressure, tightness, dizziness, shortness of air. She reports occasional palpitations which are not new and the same as always. Not concerning and are manageable for her with beta blocker. Refills requested on cardiac medications.     Cardiac History:    Past Surgical History:   Procedure Laterality Date    CARDIAC CATHETERIZATION  08/12/2011    75% LAD- 2.75x18 Promus.    CARDIAC CATHETERIZATION  05/25/2012    90% Ramus- 2.25x18 Promus    CARDIOVASCULAR STRESS TEST  07/18/2011    3 min, 30 sec. 93% THR. bp-164/72. R/O anteroseptal ischemia    CARDIOVASCULAR STRESS TEST  05/14/2012    4 min, 91% THR, 100/54, septal ischemi    CARDIOVASCULAR STRESS TEST  12/14/2012    4 min, 90% THR>BP-150/70. Negative    CARDIOVASCULAR STRESS TEST  08/26/2014    6 min, 94% THR, 132/64, negative for ischemia    CARDIOVASCULAR STRESS TEST  01/10/2018    L.Cardiolite- EF > 65%. R/O Breast attenuation    CARDIOVASCULAR STRESS TEST  01/10/2018    Lexiscan- no  definite ischemia noted    CARDIOVASCULAR STRESS TEST  07/27/2022    Lexiscan- BP- 165/71. EF > 70%. R/O Diaphramatic attenuation    CARDIOVASCULAR STRESS TEST  08/23/2024    Lexiscan- EF > 70%. 142/80. Diaphragmatic attenuation    CONVERTED (HISTORICAL) HOLTER  12/05/2012    AVG HR 76 BPM. Rare PVC's    ECHO - CONVERTED  08/02/2011    >60%    ECHO - CONVERTED  05/14/2012    EF 65-70%    ECHO - CONVERTED  08/26/2014    EF 65%, RVSP 26 mmHg    ECHO - CONVERTED  01/10/2018    EF 65%. RVSP- 30 mmHg    ECHO - CONVERTED  01/10/2018    EF 60-65%, no AS, mild MR, RVSP 30 mmHg    ECHO - CONVERTED  01/10/2018    EF 60-65%, no AS, mild MR, RVSP 30 mmHg    ECHO - CONVERTED  07/27/2022    EF 70%. LA- 4.0. Trace-Mild MR. RVSP- 28 mmHg    ECHO - CONVERTED  08/23/2024    TLS. EF 70%. LA- 3.8. Mild MR. RVSP- 34 mmHg       Current Outpatient Medications   Medication Sig Dispense Refill    ascorbic acid (VITAMIN C) 1000 MG tablet Take 1 tablet by mouth Daily.      aspirin 81 MG EC tablet Take 1 tablet by mouth Daily.      carvedilol (COREG) 3.125 MG tablet Take 1 tablet by mouth Daily. 90 tablet 3    escitalopram (LEXAPRO) 10 MG tablet Take 1 tablet by mouth Daily.      insulin detemir (Levemir FlexTouch) 100 UNIT/ML injection Inject 40 Units under the skin into the appropriate area as directed Daily. 36 mL 3    Insulin Pen Needle (BD Pen Needle Rosie 2nd Gen) 32G X 4 MM misc For daily use to give insulin 100 each 3    levothyroxine (Synthroid) 112 MCG tablet Take 1 tablet by mouth Daily. 90 tablet 3    Magnesium 250 MG tablet Take 1 tablet by mouth Daily.      metFORMIN (GLUCOPHAGE) 1000 MG tablet Take 1 tablet by mouth 2 (Two) Times a Day With Meals.      omeprazole (PriLOSEC) 20 MG capsule Take 1 capsule by mouth Daily.      pravastatin (PRAVACHOL) 40 MG tablet Take 1 tablet by mouth Daily. 90 tablet 3    Semaglutide (OZEMPIC, 1 MG/DOSE, SC) Inject  under the skin into the appropriate area as directed 1 (One) Time Per Week.       "traZODone (DESYREL) 100 MG tablet Take 1 tablet by mouth Every Night.      triamterene-hydrochlorothiazide (DYAZIDE) 37.5-25 MG per capsule Take 1 capsule by mouth Every Morning. 90 capsule 3    Vitamin D, Cholecalciferol, 1000 UNITS tablet Take 1 tablet by mouth Daily.       No current facility-administered medications for this visit.       Codeine, Farxiga [dapagliflozin], Invokana [canagliflozin], and Tresiba flextouch [insulin degludec]    Past Medical History:   Diagnosis Date    Anemia 2019    CAD (coronary artery disease) 2017    Diabetes mellitus     GERD (gastroesophageal reflux disease)     H/O foot surgery     left and right foot    History of cholecystectomy     History of tonsillectomy     HTN (hypertension)     Hyperlipidemia     Hypothyroidism     Pancreatitis     Type 2 diabetes mellitus 2004    Vitamin D deficiency        Social History     Socioeconomic History    Marital status:    Tobacco Use    Smoking status: Former     Current packs/day: 0.00     Types: Cigarettes     Quit date: 1986     Years since quittin.1     Passive exposure: Past    Smokeless tobacco: Never   Vaping Use    Vaping status: Never Used   Substance and Sexual Activity    Alcohol use: No    Drug use: No    Sexual activity: Yes     Partners: Male     Birth control/protection: Partner of same sex       Family History   Problem Relation Age of Onset    Heart attack Mother     Cancer Mother     Heart disease Father        Vitals:   /68 (BP Location: Right arm, Patient Position: Sitting)   Pulse 76   Ht 162 cm (63.78\")   Wt 75.3 kg (166 lb)   BMI 28.69 kg/m²     Physical Exam  Vitals and nursing note reviewed.   Neck:      Vascular: No carotid bruit.   Cardiovascular:      Rate and Rhythm: Normal rate and regular rhythm.      Pulses: Normal pulses.      Heart sounds: Normal heart sounds. No murmur heard.     No friction rub. No gallop.   Pulmonary:      Effort: Pulmonary effort is normal.      " Breath sounds: Normal breath sounds and air entry.   Musculoskeletal:      Right lower leg: No edema.      Left lower leg: No edema.   Skin:     General: Skin is warm and dry.      Capillary Refill: Capillary refill takes less than 2 seconds.   Neurological:      Mental Status: She is alert and oriented to person, place, and time.       Procedures     Assessment & Plan     Diagnoses and all orders for this visit:    1. Coronary artery disease involving native coronary artery of native heart without angina pectoris (Primary)    2. Essential hypertension  -     carvedilol (COREG) 3.125 MG tablet; Take 1 tablet by mouth Daily.  Dispense: 90 tablet; Refill: 3  -     triamterene-hydrochlorothiazide (DYAZIDE) 37.5-25 MG per capsule; Take 1 capsule by mouth Every Morning.  Dispense: 90 capsule; Refill: 3    3. Mixed hyperlipidemia  -     pravastatin (PRAVACHOL) 40 MG tablet; Take 1 tablet by mouth Daily.  Dispense: 90 tablet; Refill: 3    4. History of placement of stent in LAD coronary artery    CAD/history of stenting  - S/p stenting LAD 2012  - Most recent stress 7/2022 small area of hypoperfusion inferior wall EF 70%  - Anginal signs denied  - Continue pravastatin, aspirin    Hypertension  - BP controlled  - Continue carvedilol, Dyazide    Hyperlipidemia  - Labs managed with PCP  - Continue on current pravastatin    Stable from a cardiac standpoint. No further testing recommended at this time. No medication changes made today.  Refills on cardiac medicines sent to pharmacy    Visit Diagnoses:    ICD-10-CM ICD-9-CM   1. Coronary artery disease involving native coronary artery of native heart without angina pectoris  I25.10 414.01   2. Essential hypertension  I10 401.9   3. Mixed hyperlipidemia  E78.2 272.2   4. History of placement of stent in LAD coronary artery  Z95.5 V45.82       Meds Ordered During Visit:  New Medications Ordered This Visit   Medications    carvedilol (COREG) 3.125 MG tablet     Sig: Take 1 tablet  by mouth Daily.     Dispense:  90 tablet     Refill:  3    pravastatin (PRAVACHOL) 40 MG tablet     Sig: Take 1 tablet by mouth Daily.     Dispense:  90 tablet     Refill:  3    triamterene-hydrochlorothiazide (DYAZIDE) 37.5-25 MG per capsule     Sig: Take 1 capsule by mouth Every Morning.     Dispense:  90 capsule     Refill:  3       Follow Up Appointment:   Return in about 6 months (around 8/5/2025), or if symptoms worsen or fail to improve.           This document has been electronically signed by JULIAN Watt  February 5, 2025 12:17 EST    Dictated Utilizing Dragon Dictation: Part of this note may be an electronic transcription/translation of spoken language to printed text using the Dragon Dictation System.

## 2025-07-17 DIAGNOSIS — I10 ESSENTIAL HYPERTENSION: ICD-10-CM

## 2025-07-17 RX ORDER — TRIAMTERENE AND HYDROCHLOROTHIAZIDE 37.5; 25 MG/1; MG/1
1 CAPSULE ORAL EVERY MORNING
Qty: 90 CAPSULE | Refills: 0 | Status: SHIPPED | OUTPATIENT
Start: 2025-07-17

## 2025-08-05 ENCOUNTER — OFFICE VISIT (OUTPATIENT)
Dept: CARDIOLOGY | Facility: CLINIC | Age: 70
End: 2025-08-05
Payer: MEDICARE

## 2025-08-05 VITALS
RESPIRATION RATE: 16 BRPM | DIASTOLIC BLOOD PRESSURE: 70 MMHG | HEIGHT: 64 IN | BODY MASS INDEX: 29.94 KG/M2 | HEART RATE: 74 BPM | OXYGEN SATURATION: 98 % | SYSTOLIC BLOOD PRESSURE: 112 MMHG | WEIGHT: 175.4 LBS

## 2025-08-05 DIAGNOSIS — I25.10 CORONARY ARTERY DISEASE INVOLVING NATIVE CORONARY ARTERY OF NATIVE HEART WITHOUT ANGINA PECTORIS: ICD-10-CM

## 2025-08-05 DIAGNOSIS — E78.2 MIXED HYPERLIPIDEMIA: ICD-10-CM

## 2025-08-05 DIAGNOSIS — E66.9 OBESITY (BMI 30-39.9): ICD-10-CM

## 2025-08-05 DIAGNOSIS — Z95.5 HISTORY OF PLACEMENT OF STENT IN LAD CORONARY ARTERY: ICD-10-CM

## 2025-08-05 DIAGNOSIS — I10 ESSENTIAL HYPERTENSION: Primary | ICD-10-CM

## 2025-08-05 PROCEDURE — 3074F SYST BP LT 130 MM HG: CPT | Performed by: NURSE PRACTITIONER

## 2025-08-05 PROCEDURE — 99214 OFFICE O/P EST MOD 30 MIN: CPT | Performed by: NURSE PRACTITIONER

## 2025-08-05 PROCEDURE — 3078F DIAST BP <80 MM HG: CPT | Performed by: NURSE PRACTITIONER

## 2025-08-05 RX ORDER — PRAVASTATIN SODIUM 40 MG
40 TABLET ORAL DAILY
Qty: 90 TABLET | Refills: 3 | Status: SHIPPED | OUTPATIENT
Start: 2025-08-05

## 2025-08-05 RX ORDER — CARVEDILOL 3.12 MG/1
3.12 TABLET ORAL DAILY
Qty: 90 TABLET | Refills: 3 | Status: SHIPPED | OUTPATIENT
Start: 2025-08-05

## 2025-08-05 RX ORDER — INSULIN GLARGINE 100 [IU]/ML
INJECTION, SOLUTION SUBCUTANEOUS
COMMUNITY

## 2025-08-05 RX ORDER — ACYCLOVIR 400 MG/1
TABLET ORAL
COMMUNITY
Start: 2025-06-03

## 2025-08-05 RX ORDER — TRIAMTERENE AND HYDROCHLOROTHIAZIDE 37.5; 25 MG/1; MG/1
1 CAPSULE ORAL EVERY MORNING
Qty: 90 CAPSULE | Refills: 3 | Status: SHIPPED | OUTPATIENT
Start: 2025-08-05